# Patient Record
Sex: MALE | NOT HISPANIC OR LATINO | Employment: FULL TIME | URBAN - METROPOLITAN AREA
[De-identification: names, ages, dates, MRNs, and addresses within clinical notes are randomized per-mention and may not be internally consistent; named-entity substitution may affect disease eponyms.]

---

## 2017-02-14 ENCOUNTER — HOSPITAL ENCOUNTER (EMERGENCY)
Facility: HOSPITAL | Age: 32
Discharge: HOME | End: 2017-02-14
Admitting: EMERGENCY MEDICINE

## 2017-02-14 ENCOUNTER — HOSPITAL ENCOUNTER (EMERGENCY)
Facility: HOSPITAL | Age: 32
Discharge: LEFT BEFORE DISCHARGE | End: 2017-02-14

## 2017-02-14 ENCOUNTER — APPOINTMENT (OUTPATIENT)
Dept: GENERAL RADIOLOGY | Facility: HOSPITAL | Age: 32
End: 2017-02-14

## 2017-02-14 VITALS
OXYGEN SATURATION: 98 % | WEIGHT: 240 LBS | RESPIRATION RATE: 18 BRPM | TEMPERATURE: 98.5 F | HEIGHT: 71 IN | HEART RATE: 75 BPM | BODY MASS INDEX: 33.6 KG/M2 | DIASTOLIC BLOOD PRESSURE: 100 MMHG | SYSTOLIC BLOOD PRESSURE: 147 MMHG

## 2017-02-14 DIAGNOSIS — S62.306A CLOSED DISPLACED FRACTURE OF FIFTH METACARPAL BONE OF RIGHT HAND, UNSPECIFIED PORTION OF METACARPAL, INITIAL ENCOUNTER: Primary | ICD-10-CM

## 2017-02-14 PROCEDURE — 99283 EMERGENCY DEPT VISIT LOW MDM: CPT

## 2017-02-14 PROCEDURE — 73130 X-RAY EXAM OF HAND: CPT

## 2017-02-14 PROCEDURE — 99284 EMERGENCY DEPT VISIT MOD MDM: CPT | Performed by: NURSE PRACTITIONER

## 2017-02-14 RX ORDER — HYDROCODONE BITARTRATE AND ACETAMINOPHEN 5; 325 MG/1; MG/1
1 TABLET ORAL EVERY 4 HOURS PRN
Qty: 12 TABLET | Refills: 0 | Status: SHIPPED | OUTPATIENT
Start: 2017-02-14 | End: 2017-03-21

## 2017-02-14 RX ORDER — HYDROCODONE BITARTRATE AND ACETAMINOPHEN 5; 325 MG/1; MG/1
1 TABLET ORAL ONCE AS NEEDED
Status: DISCONTINUED | OUTPATIENT
Start: 2017-02-14 | End: 2017-02-14

## 2017-02-14 NOTE — ED PROVIDER NOTES
Subjective   Patient is a 31 y.o. male presenting with hand injury.   Hand Injury   Location:  Hand  Hand location:  R hand and dorsum of R hand  Injury: yes    Time since incident:  1 day  Mechanism of injury comment:  Patient slammed his hand into a table last night.  Pain details:     Quality:  Throbbing    Radiates to:  Does not radiate    Severity:  Moderate    Onset quality:  Sudden    Duration:  1 day    Timing:  Constant    Progression:  Unchanged  Handedness:  Right-handed  Foreign body present:  No foreign bodies  Prior injury to area:  No  Relieved by:  Nothing  Worsened by:  Exercise and movement  Associated symptoms: decreased range of motion, numbness and swelling    Associated symptoms: no back pain, no fatigue, no fever, no muscle weakness, no stiffness and no tingling        Review of Systems   Constitutional: Negative for activity change, appetite change, chills, diaphoresis, fatigue and fever.   HENT: Negative for congestion, sore throat and trouble swallowing.    Gastrointestinal: Negative for diarrhea, nausea and vomiting.   Musculoskeletal: Positive for arthralgias and joint swelling. Negative for back pain, myalgias and stiffness.   Skin: Negative for color change and rash.   Neurological: Negative for dizziness and headaches.       Past Medical History   Diagnosis Date   • Anxiety    • Depression        No Known Allergies    Past Surgical History   Procedure Laterality Date   • Tonsillectomy         Family History   Problem Relation Age of Onset   • Alcohol abuse Mother    • Hypertension Father    • Heart disease Father    • Alcohol abuse Father        Social History     Social History   • Marital status: Single     Spouse name: N/A   • Number of children: N/A   • Years of education: N/A     Social History Main Topics   • Smoking status: Former Smoker     Types: Cigarettes     Quit date: 2012   • Smokeless tobacco: Never Used   • Alcohol use Yes      Comment: Occasional   • Drug use: No   •  Sexual activity: Not Asked     Other Topics Concern   • None     Social History Narrative           Objective   Physical Exam   Constitutional: He is oriented to person, place, and time. He appears well-developed and well-nourished. No distress.   HENT:   Head: Normocephalic and atraumatic.   Right Ear: External ear normal.   Left Ear: External ear normal.   Nose: Nose normal.   Mouth/Throat: Oropharynx is clear and moist.   Eyes: Conjunctivae are normal. Pupils are equal, round, and reactive to light.   Neck: Neck supple. No tracheal deviation present. No thyromegaly present.   Cardiovascular: Normal rate, normal heart sounds and intact distal pulses.  Exam reveals no gallop.    No murmur heard.  Pulmonary/Chest: Effort normal and breath sounds normal. No respiratory distress. He has no wheezes. He has no rales.   Abdominal: Soft. Bowel sounds are normal. He exhibits no distension. There is no tenderness.   Musculoskeletal:   Dorsum the right hands edematous and erythematous.  Tender to palpation over the third fourth fifth distal metacarpals.  Has limited range of motion of his fingers 3, 4 and 5   Lymphadenopathy:     He has no cervical adenopathy.   Neurological: He is alert and oriented to person, place, and time.   Skin: Skin is warm and dry. He is not diaphoretic. There is erythema. No pallor.   Psychiatric: He has a normal mood and affect. Thought content normal.   Nursing note and vitals reviewed.      Procedures         ED Course  ED Course   Comment By Time   Since the ED with complaint of right hand painful and swollen.  States he slammed his hand into a table last night. MARQUES Sarkar 02/14 1711   X-ray the right hand reveals a closed, medially displaced fracture of the distal head of the fifth metacarpal.  Films were reviewed by Dr. Janki Freire, APRALLISON 02/14 1719   Placed a call to Dr. Cameron Mathew office to give him a referral for this hand fracture. Wan Freire APRALLISON 02/14 0073    Discussed patient with Dr. Peralta.  He gave directions to have the patient call him in the morning to set up an appointment to be seen in possible follow-up with surgery MARQUES Sarkar 02/14 1824   Patient is stable safe to go.  Will D/C  him with a prescription for pain medication. MARQUES Sarkar 02/14 1836                  [facility]    Final diagnoses:   Closed displaced fracture of fifth metacarpal bone of right hand, unspecified portion of metacarpal, initial encounter            MARQUES Sarkar  02/14/17 6308

## 2017-02-14 NOTE — ED NOTES
Pt's right hand is swollen on dorsal side.  He is able to move his fingers.  Right radial pulse is palpable.  Denies numbness or tingling in fingers.     Haleigh Hayes RN  02/14/17 5023

## 2017-02-15 NOTE — H&P
"      Patient Care Team:  Destiny Stock MD as PCP - General (Internal Medicine)    Chief complaint     Subjective \" I broke my right hand\".    History of Present IllnessSustained fracture of small finger metacarpal striking table. Refered by ER.    Review of Systems   Constitutional: Negative.    HENT: Negative.    Eyes: Negative.    Respiratory: Negative.    Cardiovascular: Negative.    Gastrointestinal: Negative.    Endocrine: Negative.    Genitourinary: Negative.    Musculoskeletal: Negative.    Skin: Negative.    Allergic/Immunologic: Negative.    Neurological: Negative.    Hematological: Negative.    Psychiatric/Behavioral: Negative.         Past Medical History   Diagnosis Date   • Anxiety    • Depression      Past Surgical History   Procedure Laterality Date   • Tonsillectomy       Family History   Problem Relation Age of Onset   • Alcohol abuse Mother    • Hypertension Father    • Heart disease Father    • Alcohol abuse Father      Social History   Substance Use Topics   • Smoking status: Former Smoker     Types: Cigarettes     Quit date: 2012   • Smokeless tobacco: Never Used   • Alcohol use Yes      Comment: Occasional     No prescriptions prior to admission.     Allergies:  Review of patient's allergies indicates no known allergies.      Objective      Vital Signs       Physical Exam   Constitutional: He is oriented to person, place, and time. He appears well-developed and well-nourished.   HENT:   Head: Normocephalic and atraumatic.   Right Ear: External ear normal.   Left Ear: External ear normal.   Nose: Nose normal.   Mouth/Throat: Oropharynx is clear and moist.   Eyes: Conjunctivae and EOM are normal. Pupils are equal, round, and reactive to light.   Neck: Normal range of motion. Neck supple.   Cardiovascular: Normal rate, regular rhythm, normal heart sounds and intact distal pulses.    Pulmonary/Chest: Effort normal and breath sounds normal.   Abdominal: Soft. Bowel sounds are normal. "   Musculoskeletal: He exhibits tenderness and deformity.   Neurological: He is alert and oriented to person, place, and time. He has normal reflexes.   Skin: Skin is warm and dry.   Psychiatric: He has a normal mood and affect. His behavior is normal. Judgment and thought content normal.   Swelling of the right hand. Depressed metacarpal head.    Results Review:   I reviewed the patient's new clinical results.      Assessment/Plan  As above.    Active Problems:    * No active hospital problems. *      Assessment & Plan    I discussed the patients findings and my recommendations with patient    Cameron Mathew MD  02/15/17  6:45 PM    Time:

## 2017-02-17 ENCOUNTER — APPOINTMENT (OUTPATIENT)
Dept: GENERAL RADIOLOGY | Facility: HOSPITAL | Age: 32
End: 2017-02-17

## 2017-02-17 ENCOUNTER — ANESTHESIA EVENT (OUTPATIENT)
Dept: PERIOP | Facility: HOSPITAL | Age: 32
End: 2017-02-17

## 2017-02-17 ENCOUNTER — ANESTHESIA (OUTPATIENT)
Dept: PERIOP | Facility: HOSPITAL | Age: 32
End: 2017-02-17

## 2017-02-17 ENCOUNTER — HOSPITAL ENCOUNTER (OUTPATIENT)
Facility: HOSPITAL | Age: 32
Setting detail: HOSPITAL OUTPATIENT SURGERY
Discharge: HOME | End: 2017-02-17
Attending: PLASTIC SURGERY | Admitting: PLASTIC SURGERY

## 2017-02-17 VITALS
SYSTOLIC BLOOD PRESSURE: 133 MMHG | BODY MASS INDEX: 34.86 KG/M2 | WEIGHT: 249 LBS | HEIGHT: 71 IN | HEART RATE: 59 BPM | OXYGEN SATURATION: 99 % | TEMPERATURE: 97.3 F | RESPIRATION RATE: 16 BRPM | DIASTOLIC BLOOD PRESSURE: 82 MMHG

## 2017-02-17 PROCEDURE — 25010000002 FENTANYL CITRATE (PF) 100 MCG/2ML SOLUTION: Performed by: ANESTHESIOLOGY

## 2017-02-17 PROCEDURE — 76000 FLUOROSCOPY <1 HR PHYS/QHP: CPT

## 2017-02-17 PROCEDURE — 25010000002 PROPOFOL 10 MG/ML EMULSION: Performed by: NURSE ANESTHETIST, CERTIFIED REGISTERED

## 2017-02-17 PROCEDURE — C1713 ANCHOR/SCREW BN/BN,TIS/BN: HCPCS | Performed by: PLASTIC SURGERY

## 2017-02-17 PROCEDURE — 73130 X-RAY EXAM OF HAND: CPT

## 2017-02-17 PROCEDURE — 25010000002 ONDANSETRON PER 1 MG: Performed by: NURSE ANESTHETIST, CERTIFIED REGISTERED

## 2017-02-17 PROCEDURE — 25010000002 MIDAZOLAM PER 1 MG: Performed by: ANESTHESIOLOGY

## 2017-02-17 DEVICE — KWIRE SMOTH DBL/TROC .045X4IN: Type: IMPLANTABLE DEVICE | Site: FINGER LITTLE | Status: FUNCTIONAL

## 2017-02-17 RX ORDER — ONDANSETRON 2 MG/ML
4 INJECTION INTRAMUSCULAR; INTRAVENOUS ONCE AS NEEDED
Status: DISCONTINUED | OUTPATIENT
Start: 2017-02-17 | End: 2017-02-17 | Stop reason: HOSPADM

## 2017-02-17 RX ORDER — FAMOTIDINE 10 MG/ML
20 INJECTION, SOLUTION INTRAVENOUS ONCE
Status: COMPLETED | OUTPATIENT
Start: 2017-02-17 | End: 2017-02-17

## 2017-02-17 RX ORDER — LABETALOL HYDROCHLORIDE 5 MG/ML
5 INJECTION, SOLUTION INTRAVENOUS
Status: DISCONTINUED | OUTPATIENT
Start: 2017-02-17 | End: 2017-02-17 | Stop reason: HOSPADM

## 2017-02-17 RX ORDER — HYDROCODONE BITARTRATE AND ACETAMINOPHEN 7.5; 325 MG/1; MG/1
1 TABLET ORAL ONCE AS NEEDED
Status: DISCONTINUED | OUTPATIENT
Start: 2017-02-17 | End: 2017-02-17 | Stop reason: HOSPADM

## 2017-02-17 RX ORDER — HYDROCODONE BITARTRATE AND ACETAMINOPHEN 5; 325 MG/1; MG/1
1 TABLET ORAL ONCE AS NEEDED
Status: DISCONTINUED | OUTPATIENT
Start: 2017-02-17 | End: 2017-02-17 | Stop reason: HOSPADM

## 2017-02-17 RX ORDER — SODIUM CHLORIDE 0.9 % (FLUSH) 0.9 %
1-10 SYRINGE (ML) INJECTION AS NEEDED
Status: DISCONTINUED | OUTPATIENT
Start: 2017-02-17 | End: 2017-02-17 | Stop reason: HOSPADM

## 2017-02-17 RX ORDER — HYDROCODONE BITARTRATE AND ACETAMINOPHEN 5; 325 MG/1; MG/1
1-2 TABLET ORAL EVERY 4 HOURS PRN
Qty: 30 TABLET | Refills: 0 | Status: SHIPPED | OUTPATIENT
Start: 2017-02-17 | End: 2017-03-21

## 2017-02-17 RX ORDER — HYDRALAZINE HYDROCHLORIDE 20 MG/ML
5 INJECTION INTRAMUSCULAR; INTRAVENOUS
Status: DISCONTINUED | OUTPATIENT
Start: 2017-02-17 | End: 2017-02-17 | Stop reason: HOSPADM

## 2017-02-17 RX ORDER — PROMETHAZINE HYDROCHLORIDE 25 MG/1
25 TABLET ORAL ONCE AS NEEDED
Status: DISCONTINUED | OUTPATIENT
Start: 2017-02-17 | End: 2017-02-17 | Stop reason: HOSPADM

## 2017-02-17 RX ORDER — PROPOFOL 10 MG/ML
VIAL (ML) INTRAVENOUS AS NEEDED
Status: DISCONTINUED | OUTPATIENT
Start: 2017-02-17 | End: 2017-02-17 | Stop reason: SURG

## 2017-02-17 RX ORDER — FENTANYL CITRATE 50 UG/ML
50 INJECTION, SOLUTION INTRAMUSCULAR; INTRAVENOUS
Status: DISCONTINUED | OUTPATIENT
Start: 2017-02-17 | End: 2017-02-17 | Stop reason: HOSPADM

## 2017-02-17 RX ORDER — LIDOCAINE HYDROCHLORIDE 20 MG/ML
INJECTION, SOLUTION INFILTRATION; PERINEURAL AS NEEDED
Status: DISCONTINUED | OUTPATIENT
Start: 2017-02-17 | End: 2017-02-17 | Stop reason: SURG

## 2017-02-17 RX ORDER — PROMETHAZINE HYDROCHLORIDE 25 MG/ML
12.5 INJECTION, SOLUTION INTRAMUSCULAR; INTRAVENOUS ONCE AS NEEDED
Status: DISCONTINUED | OUTPATIENT
Start: 2017-02-17 | End: 2017-02-17 | Stop reason: HOSPADM

## 2017-02-17 RX ORDER — HYDROMORPHONE HYDROCHLORIDE 1 MG/ML
0.25 INJECTION, SOLUTION INTRAMUSCULAR; INTRAVENOUS; SUBCUTANEOUS
Status: DISCONTINUED | OUTPATIENT
Start: 2017-02-17 | End: 2017-02-17 | Stop reason: HOSPADM

## 2017-02-17 RX ORDER — NALOXONE HCL 0.4 MG/ML
0.2 VIAL (ML) INJECTION AS NEEDED
Status: DISCONTINUED | OUTPATIENT
Start: 2017-02-17 | End: 2017-02-17 | Stop reason: HOSPADM

## 2017-02-17 RX ORDER — MIDAZOLAM HYDROCHLORIDE 1 MG/ML
1 INJECTION INTRAMUSCULAR; INTRAVENOUS
Status: DISCONTINUED | OUTPATIENT
Start: 2017-02-17 | End: 2017-02-17 | Stop reason: HOSPADM

## 2017-02-17 RX ORDER — ONDANSETRON 2 MG/ML
INJECTION INTRAMUSCULAR; INTRAVENOUS AS NEEDED
Status: DISCONTINUED | OUTPATIENT
Start: 2017-02-17 | End: 2017-02-17 | Stop reason: SURG

## 2017-02-17 RX ORDER — OXYCODONE AND ACETAMINOPHEN 7.5; 325 MG/1; MG/1
1 TABLET ORAL ONCE AS NEEDED
Status: COMPLETED | OUTPATIENT
Start: 2017-02-17 | End: 2017-02-17

## 2017-02-17 RX ORDER — DIPHENHYDRAMINE HYDROCHLORIDE 50 MG/ML
12.5 INJECTION INTRAMUSCULAR; INTRAVENOUS
Status: DISCONTINUED | OUTPATIENT
Start: 2017-02-17 | End: 2017-02-17 | Stop reason: HOSPADM

## 2017-02-17 RX ORDER — PROMETHAZINE HYDROCHLORIDE 25 MG/1
12.5 TABLET ORAL ONCE AS NEEDED
Status: DISCONTINUED | OUTPATIENT
Start: 2017-02-17 | End: 2017-02-17 | Stop reason: HOSPADM

## 2017-02-17 RX ORDER — SODIUM CHLORIDE, SODIUM LACTATE, POTASSIUM CHLORIDE, CALCIUM CHLORIDE 600; 310; 30; 20 MG/100ML; MG/100ML; MG/100ML; MG/100ML
9 INJECTION, SOLUTION INTRAVENOUS CONTINUOUS
Status: DISCONTINUED | OUTPATIENT
Start: 2017-02-17 | End: 2017-02-17 | Stop reason: HOSPADM

## 2017-02-17 RX ORDER — PROMETHAZINE HYDROCHLORIDE 25 MG/1
25 SUPPOSITORY RECTAL ONCE AS NEEDED
Status: DISCONTINUED | OUTPATIENT
Start: 2017-02-17 | End: 2017-02-17 | Stop reason: HOSPADM

## 2017-02-17 RX ORDER — BUPIVACAINE HYDROCHLORIDE 5 MG/ML
INJECTION, SOLUTION PERINEURAL AS NEEDED
Status: DISCONTINUED | OUTPATIENT
Start: 2017-02-17 | End: 2017-02-17 | Stop reason: HOSPADM

## 2017-02-17 RX ORDER — MIDAZOLAM HYDROCHLORIDE 1 MG/ML
2 INJECTION INTRAMUSCULAR; INTRAVENOUS
Status: DISCONTINUED | OUTPATIENT
Start: 2017-02-17 | End: 2017-02-17 | Stop reason: HOSPADM

## 2017-02-17 RX ADMIN — PROPOFOL 300 MG: 10 INJECTION, EMULSION INTRAVENOUS at 14:07

## 2017-02-17 RX ADMIN — LIDOCAINE HYDROCHLORIDE 40 MG: 20 INJECTION, SOLUTION INFILTRATION; PERINEURAL at 14:07

## 2017-02-17 RX ADMIN — FENTANYL CITRATE 25 MCG: 50 INJECTION INTRAMUSCULAR; INTRAVENOUS at 14:12

## 2017-02-17 RX ADMIN — SODIUM CHLORIDE, POTASSIUM CHLORIDE, SODIUM LACTATE AND CALCIUM CHLORIDE 9 ML/HR: 600; 310; 30; 20 INJECTION, SOLUTION INTRAVENOUS at 12:26

## 2017-02-17 RX ADMIN — HYDROCODONE BITARTRATE AND ACETAMINOPHEN 1 TABLET: 5; 325 TABLET ORAL at 15:35

## 2017-02-17 RX ADMIN — ONDANSETRON 4 MG: 2 INJECTION INTRAMUSCULAR; INTRAVENOUS at 14:31

## 2017-02-17 RX ADMIN — FAMOTIDINE 20 MG: 10 INJECTION, SOLUTION INTRAVENOUS at 13:21

## 2017-02-17 RX ADMIN — FENTANYL CITRATE 75 MCG: 50 INJECTION INTRAMUSCULAR; INTRAVENOUS at 14:31

## 2017-02-17 RX ADMIN — MIDAZOLAM 2 MG: 1 INJECTION INTRAMUSCULAR; INTRAVENOUS at 13:21

## 2017-02-17 RX ADMIN — OXYCODONE HYDROCHLORIDE AND ACETAMINOPHEN 1 TABLET: 7.5; 325 TABLET ORAL at 16:29

## 2017-02-17 RX ADMIN — FENTANYL CITRATE 50 MCG: 50 INJECTION INTRAMUSCULAR; INTRAVENOUS at 15:23

## 2017-02-17 NOTE — PLAN OF CARE
Problem: Patient Care Overview (Adult)  Goal: Plan of Care Review  Outcome: Ongoing (interventions implemented as appropriate)    02/17/17 1201   Coping/Psychosocial Response Interventions   Plan Of Care Reviewed With patient   Patient Care Overview   Progress improving

## 2017-02-17 NOTE — PLAN OF CARE
Problem: Perioperative Period (Adult)  Goal: Signs and Symptoms of Listed Potential Problems Will be Absent or Manageable (Perioperative Period)  Outcome: Ongoing (interventions implemented as appropriate)    02/17/17 1201   Perioperative Period   Problems Assessed (Perioperative Period) all   Problems Present (Perioperative Period) none

## 2017-02-17 NOTE — BRIEF OP NOTE
FINGER/THUMB OPEN REDUCTION INTERNAL FIXATION  Procedure Note    Luis Miguel Carrera  2/17/2017    Pre-op Diagnosis:   Fracture of small finger metacarpal neck with displacement    Post-op Diagnosis:     same    Procedure/CPT® Codes:      Procedure(s):  PERCUTANOUS FIXATION OF SMALL FINGER METACARPAL FRACTURE RT HAND    Surgeon(s):  Cameron Mathew MD    Anesthesia: General    Staff:   Circulator: Ana Corbett RN  Radiology Technologist: Debra Hopkins RRT  Scrub Person: Fei Espinal    Estimated Blood Loss: none    Specimens:                * No specimens in log *      Drains:   0       Findings: As above  mplications: none      Cameron Mathew MD     Date: 2/17/2017  Time: 2:44 PM

## 2017-02-17 NOTE — PLAN OF CARE
Problem: Patient Care Overview (Adult)  Goal: Plan of Care Review  Outcome: Ongoing (interventions implemented as appropriate)    02/17/17 1200   Coping/Psychosocial Response Interventions   Plan Of Care Reviewed With patient   Patient Care Overview   Progress improving       Goal: Discharge Needs Assessment  Outcome: Outcome(s) achieved Date Met:  02/17/17 02/17/17 1200   Discharge Needs Assessment   Concerns To Be Addressed no discharge needs identified   Equipment Needed After Discharge none   Self-Care   Equipment Currently Used at Home none

## 2017-02-17 NOTE — PLAN OF CARE
Problem: Patient Care Overview (Adult)  Goal: Discharge Needs Assessment  Outcome: Outcome(s) achieved Date Met:  02/17/17 02/17/17 7614   Discharge Needs Assessment   Concerns To Be Addressed no discharge needs identified

## 2017-02-17 NOTE — PLAN OF CARE
Problem: Patient Care Overview (Adult)  Goal: Adult Individualization and Mutuality  Outcome: Outcome(s) achieved Date Met:  02/17/17

## 2017-02-17 NOTE — ANESTHESIA PROCEDURE NOTES
Airway  Urgency: elective    Airway not difficult    General Information and Staff    Patient location during procedure: OR  Anesthesiologist: RENDER, RIP RAY  CRNA: JOANNE GILLESPIE    Indications and Patient Condition  Indications for airway management: airway protection    Preoxygenated: yes  Mask difficulty assessment: 1 - vent by mask    Final Airway Details  Final airway type: supraglottic airway      Successful airway: unique  Size 5    Number of attempts at approach: 1    Additional Comments  Smooth IV/mask induction and placement of LMA. Atraumatic, lips/teeth/mouth intact, as preop. +ETCO2, bilateral breath sounds and equal.

## 2017-02-17 NOTE — PLAN OF CARE
Problem: Patient Care Overview (Adult)  Goal: Plan of Care Review  Outcome: Outcome(s) achieved Date Met:  02/17/17 02/17/17 0099   Coping/Psychosocial Response Interventions   Plan Of Care Reviewed With patient   Patient Care Overview   Progress improving

## 2017-02-17 NOTE — PLAN OF CARE
Problem: Perioperative Period (Adult)  Goal: Signs and Symptoms of Listed Potential Problems Will be Absent or Manageable (Perioperative Period)  Outcome: Outcome(s) achieved Date Met:  02/17/17 02/17/17 8194   Perioperative Period   Problems Assessed (Perioperative Period) all   Problems Present (Perioperative Period) none

## 2017-02-17 NOTE — ANESTHESIA POSTPROCEDURE EVALUATION
Patient: Luis Miguel Carrera    Procedure Summary     Date Anesthesia Start Anesthesia Stop Room / Location    02/17/17 1400  [facility] / [facility]       Procedure Diagnosis Surgeon Provider    PERCUTANOUS FIXATION OF SMALL FINGER METACARPAL FRACTURE RT HAND (Right Hand) No diagnosis on file. MD Donny Cooney MD          Anesthesia Type: general  Last vitals  BP      Temp      Pulse 68 (02/17/17 1326)   Resp 18 (02/17/17 1326)    SpO2 98 % (02/17/17 1326)      Post Anesthesia Care and Evaluation    Patient location during evaluation: bedside  Patient participation: complete - patient participated  Level of consciousness: awake and alert  Pain management: adequate  Airway patency: patent  Anesthetic complications: No anesthetic complications    Cardiovascular status: acceptable  Respiratory status: acceptable  Hydration status: acceptable

## 2017-02-17 NOTE — PLAN OF CARE
Problem: Perioperative Period (Adult)  Goal: Signs and Symptoms of Listed Potential Problems Will be Absent or Manageable (Perioperative Period)  Outcome: Ongoing (interventions implemented as appropriate)    02/17/17 1528   Perioperative Period   Problems Assessed (Perioperative Period) all   Problems Present (Perioperative Period) pain

## 2017-02-17 NOTE — ANESTHESIA PREPROCEDURE EVALUATION
Anesthesia Evaluation     Patient summary reviewed and Nursing notes reviewed      Airway   Mallampati: I  TM distance: >3 FB  Neck ROM: full  Dental - normal exam     Pulmonary - normal exam   (+) a smoker Former,   Cardiovascular - negative cardio ROS and normal exam        Neuro/Psych  (+) psychiatric history Anxiety and Depression,    GI/Hepatic/Renal/Endo - negative ROS     Musculoskeletal (-) negative ROS    Abdominal  - normal exam   Substance History - negative use     OB/GYN          Other - negative ROS                                   Anesthesia Plan    ASA 2     general     intravenous induction   Anesthetic plan and risks discussed with patient.

## 2017-02-20 NOTE — OP NOTE
Date of Procedure:  02/17/2017    PREOPERATIVE DIAGNOSIS: Fracture involving the neck of the small finger metacarpal of the right hand with displacement.    POSTOPERATIVE DIAGNOSIS: Fracture involving the neck of the small finger metacarpal of the right hand with displacement.    SURGEON: Cameron Mathew MD    PROCEDURE PERFORMED: Reduction and percutaneous fixation of a fracture involving the neck of the small finger metacarpal of the right hand.    ANESTHESIA: General.    INDICATION FOR PROCEDURE: This 31-year-old male sustained an injury of his right hand when he became aggravated and struck a table with his fist. The patient was initially evaluated by the emergency room physician. Radiographs taken of the right hand demonstrated the presence of a fracture of the neck of the small finger metacarpal with displacement. A hand surgery consultation was requested for the evaluation and treatment of the injury. The potential risks and expected benefits of the operative procedure were discussed with the patient in the office.    DESCRIPTION OF PROCEDURE: The patient was brought to the operating room and placed in the supine position. He was then administered a general anesthetic. The right hand and forearm were prepped with surgical soap and sterilely draped.    The C-arm fluoroscope was then brought into position.    A preoperative fluoroscopic image was obtained.    The fracture was then reduced by exerting pressure in a distal to proximal direction. The fluoroscopic image confirmed the adequate reduction of the fracture. This reduction was maintained by the insertion of 0.045 K wires x2. Fluoroscopic image confirmed the adequate reduction and fixation of the fracture.    The ends of the K wires were clipped and looped. Bacitracin ointment, Adaptic, and a soft compression dressing were applied. A fiberglass ulnar gutter splint was finally applied.    The patient tolerated this operation well and left the operating  room in stable condition.        Cameron Mathew M.D.  TDC:belkis  D:   02/19/2017 13:58:38  T:   02/19/2017 19:08:16  Job ID:   [id number]  Document ID:   [id number]  cc:

## 2017-03-21 ENCOUNTER — OFFICE VISIT (OUTPATIENT)
Dept: INTERNAL MEDICINE | Facility: CLINIC | Age: 32
End: 2017-03-21

## 2017-03-21 VITALS
BODY MASS INDEX: 35.56 KG/M2 | SYSTOLIC BLOOD PRESSURE: 130 MMHG | HEIGHT: 71 IN | HEART RATE: 92 BPM | DIASTOLIC BLOOD PRESSURE: 80 MMHG | OXYGEN SATURATION: 98 % | WEIGHT: 254 LBS

## 2017-03-21 DIAGNOSIS — F42.9 OCD (OBSESSIVE COMPULSIVE DISORDER): ICD-10-CM

## 2017-03-21 DIAGNOSIS — J30.9 ALLERGIC RHINITIS, UNSPECIFIED ALLERGIC RHINITIS TRIGGER, UNSPECIFIED RHINITIS SEASONALITY: ICD-10-CM

## 2017-03-21 DIAGNOSIS — H60.92 OTITIS EXTERNA OF LEFT EAR, UNSPECIFIED CHRONICITY, UNSPECIFIED TYPE: ICD-10-CM

## 2017-03-21 DIAGNOSIS — F41.9 ANXIETY: Primary | ICD-10-CM

## 2017-03-21 DIAGNOSIS — H66.005 RECURRENT ACUTE SUPPURATIVE OTITIS MEDIA WITHOUT SPONTANEOUS RUPTURE OF LEFT TYMPANIC MEMBRANE: ICD-10-CM

## 2017-03-21 DIAGNOSIS — F31.60 BIPOLAR AFFECTIVE DISORDER, CURRENT EPISODE MIXED, CURRENT EPISODE SEVERITY UNSPECIFIED (HCC): ICD-10-CM

## 2017-03-21 DIAGNOSIS — R45.4 EXCESSIVE ANGER: ICD-10-CM

## 2017-03-21 PROBLEM — K21.9 GASTROESOPHAGEAL REFLUX DISEASE WITHOUT ESOPHAGITIS: Status: ACTIVE | Noted: 2017-03-21

## 2017-03-21 PROCEDURE — 99214 OFFICE O/P EST MOD 30 MIN: CPT | Performed by: INTERNAL MEDICINE

## 2017-03-21 RX ORDER — OFLOXACIN 3 MG/ML
5 SOLUTION AURICULAR (OTIC) DAILY
Qty: 5 ML | Refills: 0 | Status: SHIPPED | OUTPATIENT
Start: 2017-03-21 | End: 2017-03-28

## 2017-03-21 RX ORDER — MOXIFLOXACIN HYDROCHLORIDE 400 MG/1
400 TABLET ORAL DAILY
Qty: 7 TABLET | Refills: 0 | Status: SHIPPED | OUTPATIENT
Start: 2017-03-21 | End: 2017-03-28

## 2017-03-21 NOTE — PATIENT INSTRUCTIONS
I want you to take Zyrtec daily for allergies   Take antibiotics as I prescribed for your ear and we will schedule ENT appointment for you   Wean Paxil as we discussed in clinic   Call elida to make appointment

## 2017-03-21 NOTE — PROGRESS NOTES
Subjective     Luis Miguel Carrera is a 31 y.o. male, who presents with a chief complaint of   Chief Complaint   Patient presents with   • Follow-up     anxiety/ therapist    • Anxiety     f/u    • Earache     L ear, pt states maybe an ENT is needed       HPI Comments:   Patient is here for follow up of his anxiety and mental health issues. He broke his right hand about one month ago due to anger at home. He states that his mother in law is the caretaker for his son Luis Miguel and could not pick him up at school due to her gas tank being low. He says that his mother in law is an addict and has issues with narcotics and he got angry and punched a table.     I spoke to his therapist yesterday, Raleigh Cardenas, who said that he was worried about the patient and feels that he has Bipolar in addition to his OCD, anxiety and anger issues. His wife went to the therapy appointment with him and that helped him. He states that he is having lows and highs in his mood regularly and when I discussed with him what Bipolar is, he agrees.     Recurrent left ear infection: I treated him with Augmentin several months ago and this improved, however, he started having pain, hearing loss and drainage again a few days ago. No fever or redness. He has also had runny, itchy nose.        The following portions of the patient's history were reviewed and updated as appropriate: allergies, current medications, past family history, past medical history, past social history, past surgical history and problem list.    Allergies: Review of patient's allergies indicates no known allergies.    Current Outpatient Prescriptions:   •  melatonin 1 MG tablet, Take 1 mg by mouth Every Night., Disp: , Rfl:   •  pantoprazole (PROTONIX) 20 MG EC tablet, Take 1 tablet by mouth Daily. (Patient taking differently: Take 20 mg by mouth Every Morning.), Disp: 30 tablet, Rfl: 3  •  moxifloxacin (AVELOX) 400 MG tablet, Take 1 tablet by mouth Daily for 7 days., Disp: 7 tablet, Rfl:  "0  •  ofloxacin (FLOXIN) 0.3 % otic solution, Administer 5 drops into the left ear Daily for 7 days., Disp: 5 mL, Rfl: 0  Medications Discontinued During This Encounter   Medication Reason   • HYDROcodone-acetaminophen (NORCO) 5-325 MG per tablet    • HYDROcodone-acetaminophen (NORCO) 5-325 MG per tablet    • PARoxetine (PAXIL) 20 MG tablet Therapy completed       Review of Systems   Constitutional: Negative for chills and fever.   HENT: Positive for ear discharge and ear pain.    Respiratory: Negative for cough and shortness of breath.    Skin: Negative for rash.   Psychiatric/Behavioral: Positive for agitation, behavioral problems, decreased concentration, dysphoric mood and sleep disturbance. Negative for suicidal ideas.       Objective     Visit Vitals   • /80 (BP Location: Left arm, Patient Position: Sitting, Cuff Size: Adult)   • Pulse 92   • Ht 71\" (180.3 cm)   • Wt 254 lb (115 kg)   • SpO2 98%   • BMI 35.43 kg/m2         Physical Exam   Constitutional: He is oriented to person, place, and time. He appears well-developed and well-nourished. No distress.   HENT:   Head: Normocephalic and atraumatic.   Right Ear: Tympanic membrane and external ear normal.   Left Ear: External ear normal. There is drainage and swelling (Canal is swollen with white debris and redness). Tympanic membrane is injected, erythematous and bulging. Decreased hearing is noted.   Mouth/Throat: Oropharynx is clear and moist. No oropharyngeal exudate.   Eyes: Conjunctivae are normal. Right eye exhibits no discharge. Left eye exhibits no discharge. No scleral icterus.   Neck: Neck supple.   Cardiovascular: Normal rate, regular rhythm and normal heart sounds.  Exam reveals no gallop and no friction rub.    No murmur heard.  Pulmonary/Chest: Effort normal and breath sounds normal. No respiratory distress. He has no wheezes. He has no rales.   Lymphadenopathy:     He has no cervical adenopathy.   Neurological: He is alert and oriented to " person, place, and time.   Skin: Skin is warm. No rash noted.   Psychiatric: He has a normal mood and affect. His behavior is normal.   Nursing note and vitals reviewed.        Results for orders placed or performed in visit on 12/14/16   Comprehensive Metabolic Panel   Result Value Ref Range    Glucose 97 65 - 99 mg/dL    BUN 15 6 - 20 mg/dL    Creatinine 1.04 0.76 - 1.27 mg/dL    eGFR Non African Am 84 >60 mL/min/1.73    eGFR African Am 102 >60 mL/min/1.73    BUN/Creatinine Ratio 14.4 7.0 - 25.0    Sodium 143 136 - 145 mmol/L    Potassium 4.1 3.5 - 5.2 mmol/L    Chloride 102 98 - 107 mmol/L    Total CO2 28.1 22.0 - 29.0 mmol/L    Calcium 10.1 8.6 - 10.5 mg/dL    Total Protein 7.1 6.0 - 8.5 g/dL    Albumin 4.60 3.50 - 5.20 g/dL    Globulin 2.5 gm/dL    A/G Ratio 1.8 g/dL    Total Bilirubin 0.3 0.2 - 1.2 mg/dL    Alkaline Phosphatase 95 40 - 129 U/L    AST (SGOT) 17 5 - 40 U/L    ALT (SGPT) 37 5 - 41 U/L   Lipase   Result Value Ref Range    Lipase 24 13 - 60 U/L   Amylase   Result Value Ref Range    Amylase 61 28 - 100 U/L   CBC & Differential   Result Value Ref Range    WBC 5.95 4.80 - 10.80 10*3/mm3    RBC 5.67 4.70 - 6.10 10*6/mm3    Hemoglobin 16.6 14.0 - 18.0 g/dL    Hematocrit 47.5 42.0 - 52.0 %    MCV 83.8 80.0 - 94.0 fL    MCH 29.3 27.0 - 31.0 pg    MCHC 34.9 31.0 - 37.0 g/dL    RDW 11.7 11.5 - 14.5 %    Platelets 326 140 - 500 10*3/mm3    Neutrophil Rel % 49.9 45.0 - 70.0 %    Lymphocyte Rel % 36.1 20.0 - 45.0 %    Monocyte Rel % 9.2 (H) 3.0 - 8.0 %    Eosinophil Rel % 3.7 0.0 - 4.0 %    Basophil Rel % 0.8 0.0 - 2.0 %    Neutrophils Absolute 2.96 1.50 - 8.30 10*3/mm3    Lymphocytes Absolute 2.15 0.60 - 4.80 10*3/mm3    Monocytes Absolute 0.55 0.00 - 1.00 10*3/mm3    Eosinophils Absolute 0.22 0.10 - 0.30 10*3/mm3    Basophils Absolute 0.05 0.00 - 0.20 10*3/mm3    Immature Granulocyte Rel % 0.3 0.0 - 0.5 %    Immature Grans Absolute 0.02 0.00 - 0.03 10*3/mm3    nRBC 0.0 0.0 - 0.0 /100 WBC        Assessment/Plan   Luis Miguel was seen today for follow-up, anxiety and earache.    Diagnoses and all orders for this visit:    Anxiety    Bipolar affective disorder, current episode mixed, current episode severity unspecified    OCD (obsessive compulsive disorder)    Excessive anger    Recurrent acute suppurative otitis media without spontaneous rupture of left tympanic membrane  -     Ambulatory Referral to ENT (Otolaryngology)    Otitis externa of left ear, unspecified chronicity, unspecified type  -     Ambulatory Referral to ENT (Otolaryngology)    Allergic rhinitis, unspecified allergic rhinitis trigger, unspecified rhinitis seasonality    Other orders  -     moxifloxacin (AVELOX) 400 MG tablet; Take 1 tablet by mouth Daily for 7 days.  -     ofloxacin (FLOXIN) 0.3 % otic solution; Administer 5 drops into the left ear Daily for 7 days.      Due to new diagnosis of bipolar, we will wean him off of his Paxil (patient instructed on how to do this ) and have him follow up with Jean for evaluation. Patient was also given information for other psychiatrist in the area that would be helpful. No SI or HI currently, but I do think that he needs assistance soon and he realizes this. They will help him with anger issues as well. Will follow up with me in about 4-6 weeks.     Ear infection: will treat with Avelox and ofloxacin drops since he failed Augmentin and will send him to ENT to be evaluated. He may need CT scan of ear/skull to evaluate, but I would like ENT to order if they think this is neccessary.     Will start Zyrtec for allergies    Return in about 5 years (around 3/21/2022) for Recheck of anxiety and bipolar.    Destiny Stock MD  03/21/2017

## 2017-03-28 ENCOUNTER — APPOINTMENT (OUTPATIENT)
Dept: GENERAL RADIOLOGY | Facility: HOSPITAL | Age: 32
End: 2017-03-28

## 2017-03-28 ENCOUNTER — HOSPITAL ENCOUNTER (EMERGENCY)
Facility: HOSPITAL | Age: 32
Discharge: HOME | End: 2017-03-28
Attending: EMERGENCY MEDICINE | Admitting: EMERGENCY MEDICINE

## 2017-03-28 VITALS
HEIGHT: 71 IN | RESPIRATION RATE: 18 BRPM | TEMPERATURE: 98.6 F | BODY MASS INDEX: 35 KG/M2 | OXYGEN SATURATION: 96 % | SYSTOLIC BLOOD PRESSURE: 141 MMHG | WEIGHT: 250 LBS | HEART RATE: 86 BPM | DIASTOLIC BLOOD PRESSURE: 103 MMHG

## 2017-03-28 DIAGNOSIS — IMO0001 ELEVATED BLOOD PRESSURE: ICD-10-CM

## 2017-03-28 DIAGNOSIS — S83.92XA SPRAIN OF LEFT KNEE, UNSPECIFIED LIGAMENT, INITIAL ENCOUNTER: ICD-10-CM

## 2017-03-28 DIAGNOSIS — S46.911A RIGHT SHOULDER STRAIN, INITIAL ENCOUNTER: ICD-10-CM

## 2017-03-28 DIAGNOSIS — S93.401A SPRAIN OF RIGHT ANKLE, UNSPECIFIED LIGAMENT, INITIAL ENCOUNTER: Primary | ICD-10-CM

## 2017-03-28 PROCEDURE — 99283 EMERGENCY DEPT VISIT LOW MDM: CPT

## 2017-03-28 PROCEDURE — 73562 X-RAY EXAM OF KNEE 3: CPT

## 2017-03-28 PROCEDURE — 73610 X-RAY EXAM OF ANKLE: CPT

## 2017-03-28 PROCEDURE — 99284 EMERGENCY DEPT VISIT MOD MDM: CPT | Performed by: EMERGENCY MEDICINE

## 2017-03-28 PROCEDURE — 73030 X-RAY EXAM OF SHOULDER: CPT

## 2017-03-28 RX ORDER — IBUPROFEN 800 MG/1
TABLET ORAL
Qty: 30 TABLET | Refills: 0 | Status: SHIPPED | OUTPATIENT
Start: 2017-03-28 | End: 2017-07-18 | Stop reason: HOSPADM

## 2017-03-28 RX ORDER — HYDROCODONE BITARTRATE AND ACETAMINOPHEN 5; 325 MG/1; MG/1
TABLET ORAL
Qty: 20 TABLET | Refills: 0 | Status: SHIPPED | OUTPATIENT
Start: 2017-03-28 | End: 2017-07-18 | Stop reason: HOSPADM

## 2017-03-28 NOTE — ED NOTES
Right ankle aircast applied,pulses intact, ace wrap applied to right ankle     Thomas Strong  03/28/17 1494
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
## 2017-03-28 NOTE — ED NOTES
Crutches provided and fitted, training provided,pt. demonstrated proper use     Thomas Strong  03/28/17 1600

## 2017-03-28 NOTE — ED NOTES
Pt returned to ED needing a work note.  Yulia Duarte RN wrote the note for March 28th thru March 30th.  At discharge the MD notified pt that he needed to follow up with ortho in 2 days, those instructions were reiterated to the pt once again.     Jocelin Whiteside  03/28/17 1306

## 2017-04-21 ENCOUNTER — TELEPHONE (OUTPATIENT)
Dept: INTERNAL MEDICINE | Facility: CLINIC | Age: 32
End: 2017-04-21

## 2017-04-21 NOTE — TELEPHONE ENCOUNTER
----- Message from Destiny Stock MD sent at 4/21/2017 12:30 PM EDT -----  Great! Thank you.   ----- Message -----     From: Gema Johnson MA     Sent: 4/21/2017  11:29 AM       To: Destiny Stock MD    I contacted the patient and he states that he called [facility]. I advised for him to contact [facility], knowing they have a large variety of resources and ask if he could keep his current therapist Raleigh Cardenas. I also advised if they denied, ask if the center know of any offices that would allow him to keep both.     ----- Message -----     From: Destiny Stock MD     Sent: 4/20/2017   3:28 PM       To: Gema Johnson MA    Please call patient and see which psychiatrist he tried to call that would not let him keep Raleigh Cardenas as his therapist? Apparently, he tried to call someone and they said he would need to switch and he does not want to do this.     I think that we provided him with several numbers and I would suggest calling the other places, probably [facility]. Raleigh was unsure of who he called. Just wanted to check on him.

## 2017-05-08 ENCOUNTER — OFFICE VISIT (OUTPATIENT)
Dept: INTERNAL MEDICINE | Facility: CLINIC | Age: 32
End: 2017-05-08

## 2017-05-08 ENCOUNTER — HOSPITAL ENCOUNTER (OUTPATIENT)
Dept: GENERAL RADIOLOGY | Facility: HOSPITAL | Age: 32
Discharge: HOME | End: 2017-05-08
Attending: FAMILY MEDICINE | Admitting: FAMILY MEDICINE

## 2017-05-08 VITALS
TEMPERATURE: 97.9 F | HEART RATE: 97 BPM | BODY MASS INDEX: 36.68 KG/M2 | WEIGHT: 262 LBS | DIASTOLIC BLOOD PRESSURE: 80 MMHG | HEIGHT: 71 IN | SYSTOLIC BLOOD PRESSURE: 116 MMHG | OXYGEN SATURATION: 97 %

## 2017-05-08 DIAGNOSIS — R06.02 SHORTNESS OF BREATH: Primary | ICD-10-CM

## 2017-05-08 DIAGNOSIS — R06.02 SHORTNESS OF BREATH: ICD-10-CM

## 2017-05-08 DIAGNOSIS — F31.60 BIPOLAR AFFECTIVE DISORDER, CURRENT EPISODE MIXED, CURRENT EPISODE SEVERITY UNSPECIFIED (HCC): ICD-10-CM

## 2017-05-08 DIAGNOSIS — R10.84 GENERALIZED ABDOMINAL PAIN: ICD-10-CM

## 2017-05-08 PROCEDURE — 71020 HC CHEST PA AND LATERAL: CPT

## 2017-05-08 PROCEDURE — 99214 OFFICE O/P EST MOD 30 MIN: CPT | Performed by: FAMILY MEDICINE

## 2017-05-09 LAB
ALBUMIN SERPL-MCNC: 4.3 G/DL (ref 3.5–5.2)
ALBUMIN/GLOB SERPL: 1.5 G/DL
ALP SERPL-CCNC: 101 U/L (ref 40–129)
ALT SERPL-CCNC: 39 U/L (ref 5–41)
AST SERPL-CCNC: 20 U/L (ref 5–40)
BASOPHILS # BLD AUTO: 0.05 10*3/MM3 (ref 0–0.2)
BASOPHILS NFR BLD AUTO: 0.7 % (ref 0–2)
BILIRUB SERPL-MCNC: 0.3 MG/DL (ref 0.2–1.2)
BNP SERPL-MCNC: 4.8 PG/ML (ref 0–100)
BUN SERPL-MCNC: 20 MG/DL (ref 6–20)
BUN/CREAT SERPL: 24.7 (ref 7–25)
CALCIUM SERPL-MCNC: 9.6 MG/DL (ref 8.6–10.5)
CHLORIDE SERPL-SCNC: 101 MMOL/L (ref 98–107)
CO2 SERPL-SCNC: 26.6 MMOL/L (ref 22–29)
CREAT SERPL-MCNC: 0.81 MG/DL (ref 0.76–1.27)
CRP SERPL-MCNC: 0.72 MG/DL (ref 0–0.5)
EOSINOPHIL # BLD AUTO: 0.19 10*3/MM3 (ref 0.1–0.3)
EOSINOPHIL NFR BLD AUTO: 2.6 % (ref 0–4)
ERYTHROCYTE [DISTWIDTH] IN BLOOD BY AUTOMATED COUNT: 11.9 % (ref 11.5–14.5)
ERYTHROCYTE [SEDIMENTATION RATE] IN BLOOD BY WESTERGREN METHOD: 5 MM/HR (ref 0–15)
FERRITIN SERPL-MCNC: 200 NG/ML (ref 30–400)
GLOBULIN SER CALC-MCNC: 2.8 GM/DL
GLUCOSE SERPL-MCNC: 96 MG/DL (ref 65–99)
HCT VFR BLD AUTO: 47 % (ref 42–52)
HGB BLD-MCNC: 16.3 G/DL (ref 14–18)
IMM GRANULOCYTES # BLD: 0.01 10*3/MM3 (ref 0–0.03)
IMM GRANULOCYTES NFR BLD: 0.1 % (ref 0–0.5)
LYMPHOCYTES # BLD AUTO: 2.04 10*3/MM3 (ref 0.6–4.8)
LYMPHOCYTES NFR BLD AUTO: 27.9 % (ref 20–45)
MCH RBC QN AUTO: 29.3 PG (ref 27–31)
MCHC RBC AUTO-ENTMCNC: 34.7 G/DL (ref 31–37)
MCV RBC AUTO: 84.4 FL (ref 80–94)
MONOCYTES # BLD AUTO: 0.51 10*3/MM3 (ref 0–1)
MONOCYTES NFR BLD AUTO: 7 % (ref 3–8)
NEUTROPHILS # BLD AUTO: 4.51 10*3/MM3 (ref 1.5–8.3)
NEUTROPHILS NFR BLD AUTO: 61.7 % (ref 45–70)
NRBC BLD AUTO-RTO: 0 /100 WBC (ref 0–0)
PLATELET # BLD AUTO: 339 10*3/MM3 (ref 140–500)
POTASSIUM SERPL-SCNC: 4.2 MMOL/L (ref 3.5–5.2)
PROT SERPL-MCNC: 7.1 G/DL (ref 6–8.5)
RBC # BLD AUTO: 5.57 10*6/MM3 (ref 4.7–6.1)
SODIUM SERPL-SCNC: 142 MMOL/L (ref 136–145)
WBC # BLD AUTO: 7.31 10*3/MM3 (ref 4.8–10.8)

## 2017-05-15 ENCOUNTER — TELEPHONE (OUTPATIENT)
Dept: INTERNAL MEDICINE | Facility: CLINIC | Age: 32
End: 2017-05-15

## 2017-06-15 ENCOUNTER — OFFICE VISIT (OUTPATIENT)
Dept: INTERNAL MEDICINE | Facility: CLINIC | Age: 32
End: 2017-06-15

## 2017-06-15 VITALS
BODY MASS INDEX: 37.1 KG/M2 | DIASTOLIC BLOOD PRESSURE: 82 MMHG | RESPIRATION RATE: 18 BRPM | SYSTOLIC BLOOD PRESSURE: 134 MMHG | OXYGEN SATURATION: 98 % | WEIGHT: 266 LBS | HEART RATE: 98 BPM

## 2017-06-15 DIAGNOSIS — S49.92XA SHOULDER INJURY, LEFT, INITIAL ENCOUNTER: ICD-10-CM

## 2017-06-15 DIAGNOSIS — S46.002S ROTATOR CUFF INJURY, LEFT, SEQUELA: ICD-10-CM

## 2017-06-15 DIAGNOSIS — H66.3X3 CHRONIC SUPPURATIVE OTITIS MEDIA OF BOTH EARS, UNSPECIFIED OTITIS MEDIA LOCATION: Primary | ICD-10-CM

## 2017-06-15 DIAGNOSIS — H60.332 ACUTE SWIMMER'S EAR OF LEFT SIDE: ICD-10-CM

## 2017-06-15 PROCEDURE — 99214 OFFICE O/P EST MOD 30 MIN: CPT | Performed by: INTERNAL MEDICINE

## 2017-06-15 RX ORDER — MOXIFLOXACIN HYDROCHLORIDE 400 MG/1
400 TABLET ORAL DAILY
Qty: 7 TABLET | Refills: 0 | Status: SHIPPED | OUTPATIENT
Start: 2017-06-15 | End: 2017-06-22
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
## 2017-06-27 DIAGNOSIS — G89.29: ICD-10-CM

## 2017-06-27 DIAGNOSIS — H92.09: ICD-10-CM

## 2017-06-27 DIAGNOSIS — H66.3X9 CHRONIC SUPPURATIVE OTITIS MEDIA, UNSPECIFIED LATERALITY, UNSPECIFIED OTITIS MEDIA LOCATION: Primary | ICD-10-CM

## 2017-07-17 ENCOUNTER — HOSPITAL ENCOUNTER (OUTPATIENT)
Facility: HOSPITAL | Age: 32
Setting detail: OBSERVATION
Discharge: HOME | End: 2017-07-18
Attending: EMERGENCY MEDICINE | Admitting: SURGERY

## 2017-07-17 ENCOUNTER — ANESTHESIA EVENT (OUTPATIENT)
Dept: PERIOP | Facility: HOSPITAL | Age: 32
End: 2017-07-17

## 2017-07-17 ENCOUNTER — ANESTHESIA (OUTPATIENT)
Dept: PERIOP | Facility: HOSPITAL | Age: 32
End: 2017-07-17

## 2017-07-17 DIAGNOSIS — K61.1 PERIRECTAL ABSCESS: Primary | ICD-10-CM

## 2017-07-17 LAB
ANION GAP SERPL CALCULATED.3IONS-SCNC: 12 MMOL/L
BASOPHILS # BLD AUTO: 0.04 10*3/MM3 (ref 0–0.2)
BASOPHILS NFR BLD AUTO: 0.3 % (ref 0–2)
BUN BLD-MCNC: 15 MG/DL (ref 6–20)
BUN/CREAT SERPL: 15.2 (ref 7–25)
CALCIUM SPEC-SCNC: 9.1 MG/DL (ref 8.6–10.5)
CHLORIDE SERPL-SCNC: 106 MMOL/L (ref 98–107)
CO2 SERPL-SCNC: 25 MMOL/L (ref 22–29)
CREAT BLD-MCNC: 0.99 MG/DL (ref 0.76–1.27)
DEPRECATED RDW RBC AUTO: 33.8 FL (ref 37–54)
EOSINOPHIL # BLD AUTO: 0.31 10*3/MM3 (ref 0.1–0.3)
EOSINOPHIL NFR BLD AUTO: 2.3 % (ref 0–4)
ERYTHROCYTE [DISTWIDTH] IN BLOOD BY AUTOMATED COUNT: 11.4 % (ref 11.5–14.5)
GFR SERPL CREATININE-BSD FRML MDRD: 89 ML/MIN/1.73
GLUCOSE BLD-MCNC: 113 MG/DL (ref 65–99)
HCT VFR BLD AUTO: 41.6 % (ref 42–52)
HGB BLD-MCNC: 14.5 G/DL (ref 14–18)
IMM GRANULOCYTES # BLD: 0.02 10*3/MM3 (ref 0–0.03)
IMM GRANULOCYTES NFR BLD: 0.1 % (ref 0–0.5)
LYMPHOCYTES # BLD AUTO: 2.34 10*3/MM3 (ref 0.6–4.8)
LYMPHOCYTES NFR BLD AUTO: 17.5 % (ref 20–45)
MCH RBC QN AUTO: 28.7 PG (ref 27–31)
MCHC RBC AUTO-ENTMCNC: 34.9 G/DL (ref 31–37)
MCV RBC AUTO: 82.4 FL (ref 80–94)
MONOCYTES # BLD AUTO: 1.5 10*3/MM3 (ref 0–1)
MONOCYTES NFR BLD AUTO: 11.2 % (ref 3–8)
NEUTROPHILS # BLD AUTO: 9.13 10*3/MM3 (ref 1.5–8.3)
NEUTROPHILS NFR BLD AUTO: 68.6 % (ref 45–70)
NRBC BLD MANUAL-RTO: 0 /100 WBC (ref 0–0)
PLATELET # BLD AUTO: 301 10*3/MM3 (ref 140–500)
PMV BLD AUTO: 8.5 FL (ref 7.4–10.4)
POTASSIUM BLD-SCNC: 4.3 MMOL/L (ref 3.5–5.2)
RBC # BLD AUTO: 5.05 10*6/MM3 (ref 4.7–6.1)
SODIUM BLD-SCNC: 143 MMOL/L (ref 136–145)
WBC NRBC COR # BLD: 13.34 10*3/MM3 (ref 4.8–10.8)

## 2017-07-17 PROCEDURE — 87186 SC STD MICRODIL/AGAR DIL: CPT | Performed by: SURGERY

## 2017-07-17 PROCEDURE — 46060 I&D ISCHIORECTAL/NTRMRL ABSC: CPT | Performed by: SURGERY

## 2017-07-17 PROCEDURE — 96365 THER/PROPH/DIAG IV INF INIT: CPT

## 2017-07-17 PROCEDURE — 25010000002 PROPOFOL 10 MG/ML EMULSION: Performed by: NURSE ANESTHETIST, CERTIFIED REGISTERED

## 2017-07-17 PROCEDURE — 45300 PROCTOSIGMOIDOSCOPY DX: CPT | Performed by: SURGERY

## 2017-07-17 PROCEDURE — 25010000002 MIDAZOLAM PER 1 MG: Performed by: NURSE ANESTHETIST, CERTIFIED REGISTERED

## 2017-07-17 PROCEDURE — 25010000002 MORPHINE PER 10 MG: Performed by: SURGERY

## 2017-07-17 PROCEDURE — G0378 HOSPITAL OBSERVATION PER HR: HCPCS

## 2017-07-17 PROCEDURE — 87205 SMEAR GRAM STAIN: CPT | Performed by: SURGERY

## 2017-07-17 PROCEDURE — 25010000002 HYDROMORPHONE PER 4 MG: Performed by: NURSE ANESTHETIST, CERTIFIED REGISTERED

## 2017-07-17 PROCEDURE — S0260 H&P FOR SURGERY: HCPCS | Performed by: SURGERY

## 2017-07-17 PROCEDURE — 25010000002 PROMETHAZINE PER 50 MG: Performed by: SURGERY

## 2017-07-17 PROCEDURE — 96376 TX/PRO/DX INJ SAME DRUG ADON: CPT

## 2017-07-17 PROCEDURE — 25010000002 FENTANYL CITRATE (PF) 100 MCG/2ML SOLUTION: Performed by: NURSE ANESTHETIST, CERTIFIED REGISTERED

## 2017-07-17 PROCEDURE — 96374 THER/PROPH/DIAG INJ IV PUSH: CPT

## 2017-07-17 PROCEDURE — 25010000003 CEFAZOLIN PER 500 MG: Performed by: SURGERY

## 2017-07-17 PROCEDURE — 25010000002 ONDANSETRON PER 1 MG: Performed by: NURSE ANESTHETIST, CERTIFIED REGISTERED

## 2017-07-17 PROCEDURE — 80048 BASIC METABOLIC PNL TOTAL CA: CPT | Performed by: SURGERY

## 2017-07-17 PROCEDURE — 96375 TX/PRO/DX INJ NEW DRUG ADDON: CPT

## 2017-07-17 PROCEDURE — 25010000002 SUCCINYLCHOLINE PER 20 MG: Performed by: NURSE ANESTHETIST, CERTIFIED REGISTERED

## 2017-07-17 PROCEDURE — 25010000002 DEXAMETHASONE PER 1 MG: Performed by: NURSE ANESTHETIST, CERTIFIED REGISTERED

## 2017-07-17 PROCEDURE — 99283 EMERGENCY DEPT VISIT LOW MDM: CPT

## 2017-07-17 PROCEDURE — 87075 CULTR BACTERIA EXCEPT BLOOD: CPT | Performed by: SURGERY

## 2017-07-17 PROCEDURE — 87077 CULTURE AEROBIC IDENTIFY: CPT | Performed by: SURGERY

## 2017-07-17 PROCEDURE — 87185 SC STD ENZYME DETCJ PER NZM: CPT | Performed by: SURGERY

## 2017-07-17 PROCEDURE — 85025 COMPLETE CBC W/AUTO DIFF WBC: CPT | Performed by: SURGERY

## 2017-07-17 PROCEDURE — 25010000002 DIPHENHYDRAMINE PER 50 MG: Performed by: NURSE ANESTHETIST, CERTIFIED REGISTERED

## 2017-07-17 PROCEDURE — 99024 POSTOP FOLLOW-UP VISIT: CPT | Performed by: SURGERY

## 2017-07-17 PROCEDURE — 94799 UNLISTED PULMONARY SVC/PX: CPT

## 2017-07-17 PROCEDURE — 96361 HYDRATE IV INFUSION ADD-ON: CPT

## 2017-07-17 PROCEDURE — 99282 EMERGENCY DEPT VISIT SF MDM: CPT | Performed by: EMERGENCY MEDICINE

## 2017-07-17 PROCEDURE — 96367 TX/PROPH/DG ADDL SEQ IV INF: CPT

## 2017-07-17 PROCEDURE — 87070 CULTURE OTHR SPECIMN AEROBIC: CPT | Performed by: SURGERY

## 2017-07-17 PROCEDURE — 87076 CULTURE ANAEROBE IDENT EACH: CPT | Performed by: SURGERY

## 2017-07-17 RX ORDER — GLYCOPYRROLATE 0.2 MG/ML
INJECTION INTRAMUSCULAR; INTRAVENOUS AS NEEDED
Status: DISCONTINUED | OUTPATIENT
Start: 2017-07-17 | End: 2017-07-17 | Stop reason: SURG

## 2017-07-17 RX ORDER — DEXAMETHASONE SODIUM PHOSPHATE 4 MG/ML
4 INJECTION, SOLUTION INTRA-ARTICULAR; INTRALESIONAL; INTRAMUSCULAR; INTRAVENOUS; SOFT TISSUE ONCE AS NEEDED
Status: COMPLETED | OUTPATIENT
Start: 2017-07-17 | End: 2017-07-17

## 2017-07-17 RX ORDER — FENTANYL CITRATE 50 UG/ML
INJECTION, SOLUTION INTRAMUSCULAR; INTRAVENOUS AS NEEDED
Status: DISCONTINUED | OUTPATIENT
Start: 2017-07-17 | End: 2017-07-17 | Stop reason: SURG

## 2017-07-17 RX ORDER — FAMOTIDINE 10 MG/ML
20 INJECTION, SOLUTION INTRAVENOUS
Status: DISCONTINUED | OUTPATIENT
Start: 2017-07-17 | End: 2017-07-17 | Stop reason: HOSPADM

## 2017-07-17 RX ORDER — PROPOFOL 10 MG/ML
VIAL (ML) INTRAVENOUS AS NEEDED
Status: DISCONTINUED | OUTPATIENT
Start: 2017-07-17 | End: 2017-07-17 | Stop reason: SURG

## 2017-07-17 RX ORDER — MORPHINE SULFATE 2 MG/ML
2 INJECTION, SOLUTION INTRAMUSCULAR; INTRAVENOUS ONCE
Status: COMPLETED | OUTPATIENT
Start: 2017-07-17 | End: 2017-07-17

## 2017-07-17 RX ORDER — SODIUM CHLORIDE 0.9 % (FLUSH) 0.9 %
10 SYRINGE (ML) INJECTION AS NEEDED
Status: DISCONTINUED | OUTPATIENT
Start: 2017-07-17 | End: 2017-07-18 | Stop reason: HOSPADM

## 2017-07-17 RX ORDER — MAGNESIUM HYDROXIDE 1200 MG/15ML
LIQUID ORAL AS NEEDED
Status: DISCONTINUED | OUTPATIENT
Start: 2017-07-17 | End: 2017-07-17 | Stop reason: HOSPADM

## 2017-07-17 RX ORDER — MORPHINE SULFATE 2 MG/ML
2 INJECTION, SOLUTION INTRAMUSCULAR; INTRAVENOUS
Status: DISCONTINUED | OUTPATIENT
Start: 2017-07-17 | End: 2017-07-18 | Stop reason: HOSPADM

## 2017-07-17 RX ORDER — SUCCINYLCHOLINE CHLORIDE 20 MG/ML
INJECTION INTRAMUSCULAR; INTRAVENOUS AS NEEDED
Status: DISCONTINUED | OUTPATIENT
Start: 2017-07-17 | End: 2017-07-17 | Stop reason: SURG

## 2017-07-17 RX ORDER — MIDAZOLAM HYDROCHLORIDE 1 MG/ML
2 INJECTION INTRAMUSCULAR; INTRAVENOUS
Status: DISCONTINUED | OUTPATIENT
Start: 2017-07-17 | End: 2017-07-17 | Stop reason: HOSPADM

## 2017-07-17 RX ORDER — PANTOPRAZOLE SODIUM 20 MG/1
20 TABLET, DELAYED RELEASE ORAL EVERY MORNING
Status: DISCONTINUED | OUTPATIENT
Start: 2017-07-18 | End: 2017-07-18 | Stop reason: HOSPADM

## 2017-07-17 RX ORDER — DIPHENHYDRAMINE HYDROCHLORIDE 50 MG/ML
12.5 INJECTION INTRAMUSCULAR; INTRAVENOUS ONCE
Status: COMPLETED | OUTPATIENT
Start: 2017-07-17 | End: 2017-07-17

## 2017-07-17 RX ORDER — OXYCODONE HYDROCHLORIDE AND ACETAMINOPHEN 5; 325 MG/1; MG/1
1 TABLET ORAL EVERY 4 HOURS PRN
Status: DISCONTINUED | OUTPATIENT
Start: 2017-07-17 | End: 2017-07-18 | Stop reason: HOSPADM

## 2017-07-17 RX ORDER — SODIUM CHLORIDE, SODIUM LACTATE, POTASSIUM CHLORIDE, CALCIUM CHLORIDE 600; 310; 30; 20 MG/100ML; MG/100ML; MG/100ML; MG/100ML
125 INJECTION, SOLUTION INTRAVENOUS CONTINUOUS
Status: DISCONTINUED | OUTPATIENT
Start: 2017-07-17 | End: 2017-07-17

## 2017-07-17 RX ORDER — ACETAMINOPHEN 500 MG
1000 TABLET ORAL EVERY 6 HOURS PRN
COMMUNITY
End: 2021-08-20 | Stop reason: HOSPADM

## 2017-07-17 RX ORDER — OXYMETAZOLINE HYDROCHLORIDE 0.05 G/100ML
2 SPRAY NASAL EVERY 8 HOURS PRN
COMMUNITY

## 2017-07-17 RX ORDER — SODIUM CHLORIDE, SODIUM LACTATE, POTASSIUM CHLORIDE, CALCIUM CHLORIDE 600; 310; 30; 20 MG/100ML; MG/100ML; MG/100ML; MG/100ML
9 INJECTION, SOLUTION INTRAVENOUS CONTINUOUS PRN
Status: DISCONTINUED | OUTPATIENT
Start: 2017-07-17 | End: 2017-07-18 | Stop reason: HOSPADM

## 2017-07-17 RX ORDER — MEPERIDINE HYDROCHLORIDE 25 MG/ML
12.5 INJECTION INTRAMUSCULAR; INTRAVENOUS; SUBCUTANEOUS
Status: DISCONTINUED | OUTPATIENT
Start: 2017-07-17 | End: 2017-07-17 | Stop reason: HOSPADM

## 2017-07-17 RX ORDER — SODIUM CHLORIDE 0.9 % (FLUSH) 0.9 %
1-10 SYRINGE (ML) INJECTION AS NEEDED
Status: DISCONTINUED | OUTPATIENT
Start: 2017-07-17 | End: 2017-07-17 | Stop reason: HOSPADM

## 2017-07-17 RX ORDER — ONDANSETRON 2 MG/ML
4 INJECTION INTRAMUSCULAR; INTRAVENOUS ONCE AS NEEDED
Status: DISCONTINUED | OUTPATIENT
Start: 2017-07-17 | End: 2017-07-17 | Stop reason: HOSPADM

## 2017-07-17 RX ORDER — ROCURONIUM BROMIDE 10 MG/ML
INJECTION, SOLUTION INTRAVENOUS AS NEEDED
Status: DISCONTINUED | OUTPATIENT
Start: 2017-07-17 | End: 2017-07-17 | Stop reason: SURG

## 2017-07-17 RX ORDER — RANITIDINE HCL 75 MG
75 TABLET ORAL DAILY
COMMUNITY
End: 2021-07-21

## 2017-07-17 RX ORDER — OXYCODONE HYDROCHLORIDE AND ACETAMINOPHEN 5; 325 MG/1; MG/1
2 TABLET ORAL EVERY 4 HOURS PRN
Status: DISCONTINUED | OUTPATIENT
Start: 2017-07-17 | End: 2017-07-18 | Stop reason: HOSPADM

## 2017-07-17 RX ORDER — SODIUM CHLORIDE, SODIUM LACTATE, POTASSIUM CHLORIDE, CALCIUM CHLORIDE 600; 310; 30; 20 MG/100ML; MG/100ML; MG/100ML; MG/100ML
50 INJECTION, SOLUTION INTRAVENOUS CONTINUOUS
Status: DISCONTINUED | OUTPATIENT
Start: 2017-07-17 | End: 2017-07-17

## 2017-07-17 RX ORDER — HYDROMORPHONE HCL 110MG/55ML
PATIENT CONTROLLED ANALGESIA SYRINGE INTRAVENOUS
Status: DISPENSED
Start: 2017-07-17 | End: 2017-07-18

## 2017-07-17 RX ORDER — ONDANSETRON 2 MG/ML
4 INJECTION INTRAMUSCULAR; INTRAVENOUS ONCE AS NEEDED
Status: COMPLETED | OUTPATIENT
Start: 2017-07-17 | End: 2017-07-17

## 2017-07-17 RX ORDER — MIDAZOLAM HYDROCHLORIDE 1 MG/ML
1 INJECTION INTRAMUSCULAR; INTRAVENOUS
Status: DISCONTINUED | OUTPATIENT
Start: 2017-07-17 | End: 2017-07-17 | Stop reason: HOSPADM

## 2017-07-17 RX ORDER — CEFAZOLIN SODIUM 1 G/3ML
INJECTION, POWDER, FOR SOLUTION INTRAMUSCULAR; INTRAVENOUS
Status: DISPENSED
Start: 2017-07-17 | End: 2017-07-17

## 2017-07-17 RX ORDER — HYDROMORPHONE HCL 110MG/55ML
1 PATIENT CONTROLLED ANALGESIA SYRINGE INTRAVENOUS
Status: DISCONTINUED | OUTPATIENT
Start: 2017-07-17 | End: 2017-07-17 | Stop reason: HOSPADM

## 2017-07-17 RX ORDER — LIDOCAINE HYDROCHLORIDE 20 MG/ML
INJECTION, SOLUTION INFILTRATION; PERINEURAL AS NEEDED
Status: DISCONTINUED | OUTPATIENT
Start: 2017-07-17 | End: 2017-07-17 | Stop reason: SURG

## 2017-07-17 RX ADMIN — FENTANYL CITRATE 50 MCG: 50 INJECTION, SOLUTION INTRAMUSCULAR; INTRAVENOUS at 11:06

## 2017-07-17 RX ADMIN — MORPHINE SULFATE 2 MG: 2 INJECTION, SOLUTION INTRAMUSCULAR; INTRAVENOUS at 03:36

## 2017-07-17 RX ADMIN — MIDAZOLAM HYDROCHLORIDE 1 MG: 1 INJECTION, SOLUTION INTRAMUSCULAR; INTRAVENOUS at 10:57

## 2017-07-17 RX ADMIN — SODIUM CHLORIDE, PRESERVATIVE FREE 6.25 MG: 5 INJECTION INTRAVENOUS at 13:54

## 2017-07-17 RX ADMIN — SODIUM CHLORIDE, POTASSIUM CHLORIDE, SODIUM LACTATE AND CALCIUM CHLORIDE 9 ML/HR: 600; 310; 30; 20 INJECTION, SOLUTION INTRAVENOUS at 10:14

## 2017-07-17 RX ADMIN — METRONIDAZOLE 500 MG: 500 INJECTION, SOLUTION INTRAVENOUS at 03:36

## 2017-07-17 RX ADMIN — MORPHINE SULFATE 2 MG: 2 INJECTION, SOLUTION INTRAMUSCULAR; INTRAVENOUS at 08:05

## 2017-07-17 RX ADMIN — LIDOCAINE HYDROCHLORIDE 100 MG: 20 INJECTION, SOLUTION INFILTRATION; PERINEURAL at 11:06

## 2017-07-17 RX ADMIN — PROPOFOL 200 MG: 10 INJECTION, EMULSION INTRAVENOUS at 11:07

## 2017-07-17 RX ADMIN — FAMOTIDINE 20 MG: 10 INJECTION, SOLUTION INTRAVENOUS at 10:27

## 2017-07-17 RX ADMIN — CEFAZOLIN SODIUM 2 G: 2 SOLUTION INTRAVENOUS at 19:39

## 2017-07-17 RX ADMIN — DEXAMETHASONE SODIUM PHOSPHATE 4 MG: 4 INJECTION, SOLUTION INTRAMUSCULAR; INTRAVENOUS at 10:27

## 2017-07-17 RX ADMIN — MORPHINE SULFATE 2 MG: 2 INJECTION, SOLUTION INTRAMUSCULAR; INTRAVENOUS at 05:58

## 2017-07-17 RX ADMIN — MORPHINE SULFATE 2 MG: 2 INJECTION, SOLUTION INTRAMUSCULAR; INTRAVENOUS at 01:31

## 2017-07-17 RX ADMIN — FENTANYL CITRATE 50 MCG: 50 INJECTION, SOLUTION INTRAMUSCULAR; INTRAVENOUS at 11:07

## 2017-07-17 RX ADMIN — HYDROMORPHONE HYDROCHLORIDE 1 MG: 2 INJECTION, SOLUTION INTRAMUSCULAR; INTRAVENOUS; SUBCUTANEOUS at 12:33

## 2017-07-17 RX ADMIN — SODIUM CHLORIDE, POTASSIUM CHLORIDE, SODIUM LACTATE AND CALCIUM CHLORIDE 125 ML/HR: 600; 310; 30; 20 INJECTION, SOLUTION INTRAVENOUS at 02:01

## 2017-07-17 RX ADMIN — MIDAZOLAM HYDROCHLORIDE 2 MG: 1 INJECTION, SOLUTION INTRAMUSCULAR; INTRAVENOUS at 10:27

## 2017-07-17 RX ADMIN — ONDANSETRON 4 MG: 2 INJECTION, SOLUTION INTRAMUSCULAR; INTRAVENOUS at 10:27

## 2017-07-17 RX ADMIN — OXYCODONE HYDROCHLORIDE AND ACETAMINOPHEN 2 TABLET: 5; 325 TABLET ORAL at 17:49

## 2017-07-17 RX ADMIN — ROCURONIUM BROMIDE 5 MG: 10 INJECTION INTRAVENOUS at 11:06

## 2017-07-17 RX ADMIN — METRONIDAZOLE 500 MG: 500 INJECTION, SOLUTION INTRAVENOUS at 11:12

## 2017-07-17 RX ADMIN — SODIUM CHLORIDE, PRESERVATIVE FREE 6.25 MG: 5 INJECTION INTRAVENOUS at 06:46

## 2017-07-17 RX ADMIN — GLYCOPYRROLATE 0.2 MG: 0.2 INJECTION INTRAMUSCULAR; INTRAVENOUS at 11:04

## 2017-07-17 RX ADMIN — METRONIDAZOLE 500 MG: 500 INJECTION, SOLUTION INTRAVENOUS at 17:50

## 2017-07-17 RX ADMIN — HYDROMORPHONE HYDROCHLORIDE 1 MG: 2 INJECTION, SOLUTION INTRAMUSCULAR; INTRAVENOUS; SUBCUTANEOUS at 12:15

## 2017-07-17 RX ADMIN — SUCCINYLCHOLINE CHLORIDE 140 MG: 20 INJECTION, SOLUTION INTRAMUSCULAR; INTRAVENOUS at 11:08

## 2017-07-17 RX ADMIN — DIPHENHYDRAMINE HYDROCHLORIDE 12.5 MG: 50 INJECTION, SOLUTION INTRAMUSCULAR; INTRAVENOUS at 10:27

## 2017-07-17 RX ADMIN — OXYCODONE HYDROCHLORIDE AND ACETAMINOPHEN 2 TABLET: 5; 325 TABLET ORAL at 21:43

## 2017-07-17 RX ADMIN — CEFAZOLIN SODIUM 2 G: 2 SOLUTION INTRAVENOUS at 02:51

## 2017-07-17 NOTE — ED NOTES
"Chief Complaint   Patient presents with   • Abscess     Blood pressure (!) 149/106, pulse 86, temperature 97.7 °F (36.5 °C), temperature source Oral, resp. rate 18, height 71\" (180.3 cm), weight 250 lb (113 kg), SpO2 100 %.      The patient presents to room 6 complaining of an abscess to his perirectal area.  Reports that the pain started one week ago and has increased in intensity.  He is now unable to sit on his right buttocks.  Denies fever, chills, nausea or vomiting.  Denies ever having an abscess to his higinio area before.  Recalls having a tonsillar abscess and then having his tonsils removed.      Kristen Wiggins RN  07/17/17 0144    "

## 2017-07-17 NOTE — PLAN OF CARE
Problem: Perioperative Period (Adult)  Goal: Signs and Symptoms of Listed Potential Problems Will be Absent or Manageable (Perioperative Period)  Outcome: Ongoing (interventions implemented as appropriate)    07/17/17 1232   Perioperative Period   Problems Assessed (Perioperative Period) all   Problems Present (Perioperative Period) pain;physiologic stress response

## 2017-07-17 NOTE — ANESTHESIA POSTPROCEDURE EVALUATION
Patient: Luis Miguel Carrera    Procedure Summary     Date Anesthesia Start Anesthesia Stop Room / Location    07/17/17 1101 1148 [facility] / [facility]       Procedure Diagnosis Surgeon Provider    INCISION AND DRAINAGE OF PERIRECTAL ABSCESS (Left Perirectal); SIGMOIDOSCOPY RIGID (N/A ) Perirectal abscess  (Perirectal abscess [K61.1]) MD Deion Amador CRNA          Anesthesia Type: general  Last vitals  BP      Temp      Pulse     Resp      SpO2        Post Anesthesia Care and Evaluation    Patient location during evaluation: PACU  Patient participation: complete - patient participated  Level of consciousness: awake and alert  Pain score: 0  Pain management: adequate  Airway patency: patent  Anesthetic complications: No anesthetic complications    Cardiovascular status: acceptable  Respiratory status: acceptable  Hydration status: acceptable

## 2017-07-17 NOTE — H&P
Chief complaint perirectal pain  History of present illness this 31-year-old white male complains a 5 day history of perirectal pain that has increased.  He denies fevers or chills he denies any drainage from the site he denies any prior symptoms similar to this.  He denies any recent diarrhea.  He denies any inflammatory bowel disease.  Family history is significant for Crohn's disease in his father  Past medical history is essentially negative.  He denies allergies to medications  Social history he is a former smoker and drinks alcohol socially.  Review of systems is significant for some dark urine this past week.  He has decreased his oral intake.  Otherwise negative.  Physical exam is an overweight white male in no active distress.  His heart shows a regular regular rate and rhythm his lungs are clear and equal.  His abdominal exam is benign.  He has no inguinal adenopathy.  The left perirectal area he has marked induration.  His white blood count is elevated 13.3 for hemoglobin is 14.5 his glucose is mildly elevated to 113.  Assessment perirectal abscess possible fistula in ano  Plan intravenous antibiotics the risks benefits and options were discussed with patient in detail we will proceed with a rigid sigmoidoscopy incision and drainage perirectal abscess possible seton placement.

## 2017-07-17 NOTE — OP NOTE
Preoperative diagnosis perirectal abscess  postoperative diagnosis perirectal abscess fistula in ano  Procedure rigid sigmoidoscopy and incision and drainage perirectal abscess placement of seton  Complications none  Drains none  Specimen cultures to pathology  Estimated blood loss 25 cc  Anesthesia Gen. via endotracheal tube  Surgeon Dr. Hair  Findings large perirectal abscess and fistula and ano  Procedure after satisfactory induction of general anesthesia via endotracheal tube the patient was laid prone jackknife on the operating room table.  Rigid sigmoidoscopy was performed to 15 cm.  There is no evidence of any inflammatory bowel disease.  He did have some internal hemorrhoids and some mild inflammation on the lateral sidewall of his anorectal junction.  His buttocks were taped apart and the perianal area was prepped and draped in a sterile fashion.  He had an area of marked induration at the 9 o'clock position.  18-gauge needle was used to percutaneously aspirate this and pus was obtained.  This was cultured aerobically and anaerobically.  Radial incision was made overlying this  carried down into a large perirectal abscess.  This was suctioned and then irrigated.  Anus was dilated to a medium Hill-Mendez retractor and a flexible probe placed within the cavity and came out an internal opening directly adjacent to the cavity.  This appeared to be partially transsphincteric.  0 Prolene suture was placed through the eye the flexible probe and the Prolene suture was brought out the radial incision and the anus.  It was tied at 1 cm intervals.  18 Arabic red rubber catheter was cut to length was placed overlying the suture and was held in place with a sterile safety pin.  Abscess was packed with use of quarter inch iodoform gauze.  The patient tolerated the procedure well and was transferred to the recovery room after a sterile dressing was applied.  He'll be admitted for further care.

## 2017-07-17 NOTE — PLAN OF CARE
Problem: Patient Care Overview (Adult)  Goal: Plan of Care Review  Outcome: Ongoing (interventions implemented as appropriate)    07/17/17 0957   Coping/Psychosocial Response Interventions   Plan Of Care Reviewed With patient   Patient Care Overview   Progress no change   Outcome Evaluation   Outcome Summary/Follow up Plan VSS, C/O PAIN, AWAITING ANESTHESIA THEN READY FOR PROCEDURE       Goal: Adult Individualization and Mutuality  Outcome: Ongoing (interventions implemented as appropriate)    Problem: Perioperative Period (Adult)  Goal: Signs and Symptoms of Listed Potential Problems Will be Absent or Manageable (Perioperative Period)  Outcome: Ongoing (interventions implemented as appropriate)

## 2017-07-17 NOTE — ANESTHESIA PREPROCEDURE EVALUATION
Anesthesia Evaluation     Patient summary reviewed and Nursing notes reviewed   history of anesthetic complications: PONV  NPO Solid Status: > 8 hours  NPO Liquid Status: > 8 hours     Airway   Mallampati: II  TM distance: >3 FB  Neck ROM: full  no difficulty expected  Dental - normal exam     Pulmonary - normal exam    breath sounds clear to auscultation  (+) a smoker (quit 5 years ago) Former,   Cardiovascular - negative cardio ROS and normal exam    Rhythm: regular  Rate: normal        Neuro/Psych  (+) psychiatric history Anxiety,    GI/Hepatic/Renal/Endo    (+) obesity,  GERD well controlled,     Musculoskeletal (-) negative ROS    Abdominal   (+) obese,    Substance History   Alcohol use: occ.     OB/GYN negative ob/gyn ROS         Other - negative ROS                                     Anesthesia Plan    ASA 2     general     intravenous induction   Anesthetic plan and risks discussed with patient.  Use of blood products discussed with patient  Consented to blood products.

## 2017-07-17 NOTE — ED PROVIDER NOTES
"Subjective   Patient is a 31 y.o. male presenting with abscess.   History provided by:  Patient  Abscess   Location:  Pelvis  Pelvic abscess location:  Perianal  Size:  5 cm  Abscess quality: induration, painful, redness and warmth    Red streaking: no    Duration:  1 week  Progression:  Worsening  Pain details:     Severity:  Moderate    Duration:  1 week    Timing:  Constant    Progression:  Worsening  Chronicity:  New  Context comment:  As described below.  Associated symptoms: no anorexia, no fatigue, no fever, no headaches, no nausea and no vomiting    Risk factors: no family hx of MRSA, no hx of MRSA and no prior abscess      HPI Narrative:Luis Miguel is a 32 yo WM who presents secondary to an abscess near his rectum. Onset of symptoms approximately 1 week ago.  Patient initially noticed some discomfort in the left side of his rectum.  Patient thought he had perhaps \"pulled muscle\".  However patient's symptoms have progressively worsened over the course of 1 week.  He is now having significant pain which is worsened in the sitting position.  Patient presents for evaluation.        Review of Systems   Constitutional: Negative for chills, diaphoresis, fatigue and fever.   HENT: Negative for congestion, ear pain and sore throat.    Eyes: Negative for pain and discharge.   Respiratory: Negative for chest tightness, shortness of breath, wheezing and stridor.    Cardiovascular: Negative for chest pain, palpitations and leg swelling.   Gastrointestinal: Positive for hematochezia. Negative for abdominal pain, anorexia, diarrhea, nausea and vomiting.   Genitourinary: Negative for dysuria, flank pain, frequency and hematuria.   Musculoskeletal: Negative for back pain, myalgias, neck pain and neck stiffness.   Skin: Negative for color change, pallor and rash.   Neurological: Negative for dizziness, seizures, syncope and headaches.   Psychiatric/Behavioral: Negative for agitation and confusion. The patient is not " nervous/anxious.    All other systems reviewed and are negative.      Past Medical History:   Diagnosis Date   • Abdominal bloating    • Anxiety    • Depression    • Ear infection     frequent ear infections   • Hand fracture, right    • Lower abdominal pain    • Nausea and vomiting    • Obsessive compulsive disorder        No Known Allergies    Past Surgical History:   Procedure Laterality Date   • ORIF FINGER FRACTURE Right 2/17/2017    Procedure: PERCUTANOUS FIXATION OF SMALL FINGER METACARPAL FRACTURE RT HAND;  Surgeon: Cameron Mathew MD;  Location: [facility];  Service:    • TONSILLECTOMY     • TONSILLECTOMY  2009       Family History   Problem Relation Age of Onset   • Alcohol abuse Mother    • Heart disease Mother    • Hypertension Mother    • Mental illness Mother    • Hypertension Father    • Heart disease Father    • Alcohol abuse Father    • Crohn's disease Father    • Schizophrenia Father    • Drug abuse Father    • Suicide Attempts Father    • Mental illness Father    • Mental illness Sister    • Bipolar disorder Sister    • OCD Sister        Social History     Social History   • Marital status:      Spouse name: N/A   • Number of children: N/A   • Years of education: N/A     Social History Main Topics   • Smoking status: Former Smoker     Packs/day: 1.00     Years: 15.00     Types: Cigarettes     Quit date: 2012   • Smokeless tobacco: Former User     Types: Chew     Quit date: 1/1/2014   • Alcohol use 0.6 oz/week     1 Cans of beer per week      Comment: Occasional   • Drug use: No   • Sexual activity: Defer     Other Topics Concern   • None     Social History Narrative           Objective   Physical Exam   Constitutional: He is oriented to person, place, and time. He appears well-developed and well-nourished. No distress.   32 yo WM lying in bed. He is a bit obese but otherwise appears in good health.   Genitourinary: Rectal exam shows tenderness. Rectal exam shows no external hemorrhoid  and no internal hemorrhoid.         Neurological: He is alert and oriented to person, place, and time.   Skin: He is not diaphoretic.   Psychiatric: He has a normal mood and affect. His behavior is normal.   Nursing note and vitals reviewed.      Procedures         ED Course  ED Course   Comment By Time   07/17/17  1:41 AM  Patient has perirectal abscess.  This will require surgical drainage.  Calling Dr. Hair. Rohit Medina MD 07/17 0242 07/17/17  2:01 AM  Dr. Hair will hospitalize patient.  He is planning surgical I&D in the morning. Patient has been informed of this.  IV has been placed and baseline lab work obtained.  Patient given morphine 2 mg IV for comfort.  Patient feeling better. Rohit Medina MD 07/17 0243      Labs Reviewed   BASIC METABOLIC PANEL - Abnormal; Notable for the following:        Result Value    Glucose 113 (*)     All other components within normal limits    Narrative:     GFR Normal >60  Chronic Kidney Disease <60  Kidney Failure <15   CBC WITH AUTO DIFFERENTIAL - Abnormal; Notable for the following:     WBC 13.34 (*)     Hematocrit 41.6 (*)     RDW 11.4 (*)     RDW-SD 33.8 (*)     Lymphocyte % 17.5 (*)     Monocyte % 11.2 (*)     Neutrophils, Absolute 9.13 (*)     Monocytes, Absolute 1.50 (*)     Eosinophils, Absolute 0.31 (*)     All other components within normal limits   CBC AND DIFFERENTIAL    Narrative:     The following orders were created for panel order CBC & Differential.  Procedure                               Abnormality         Status                     ---------                               -----------         ------                     CBC Auto Differential[[id number]]        Abnormal            Final result                 Please view results for these tests on the individual orders.                 MDM  Number of Diagnoses or Management Options  Perirectal abscess: new and requires workup     Amount and/or Complexity of Data Reviewed  Clinical lab tests:  ordered and reviewed    Risk of Complications, Morbidity, and/or Mortality  Presenting problems: moderate  Diagnostic procedures: low  Management options: moderate    Patient Progress  Patient progress: stable      Final diagnoses:   Perirectal abscess            Rohit Medina MD  07/17/17 0244

## 2017-07-17 NOTE — PROGRESS NOTES
Postop check he is stable.  His operation was discussed with he and his wife.  He will need a second procedure for the fistula.

## 2017-07-17 NOTE — PLAN OF CARE
Problem: Pain, Acute (Adult)  Goal: Identify Related Risk Factors and Signs and Symptoms  Outcome: Ongoing (interventions implemented as appropriate)    07/17/17 0227   Pain, Acute   Related Risk Factors (Acute Pain) patient perception   Signs and Symptoms (Acute Pain) verbalization of pain descriptors       Goal: Acceptable Pain Control/Comfort Level  Outcome: Ongoing (interventions implemented as appropriate)    07/17/17 0227   Pain, Acute (Adult)   Acceptable Pain Control/Comfort Level making progress toward outcome

## 2017-07-17 NOTE — ANESTHESIA PROCEDURE NOTES
Airway  Urgency: elective    Date/Time: 7/17/2017 11:10 AM  Airway not difficult    General Information and Staff    Patient location during procedure: OR    Indications and Patient Condition  Indications for airway management: airway protection    Preoxygenated: yes  MILS maintained throughout  Mask difficulty assessment: 1 - vent by mask    Final Airway Details  Final airway type: endotracheal airway      Successful airway: ETT  Cuffed: yes   Successful intubation technique: direct laryngoscopy  Facilitating devices/methods: intubating stylet and cricoid pressure  Endotracheal tube insertion site: oral  Blade: Ewing  Blade size: #3  ETT size: 8.0 mm  Cormack-Lehane Classification: grade IIa - partial view of glottis  Placement verified by: chest auscultation and capnometry   Inital cuff pressure (cm H2O): 0  Measured from: lips  ETT to lips (cm): 23  Number of attempts at approach: 1

## 2017-07-18 VITALS
SYSTOLIC BLOOD PRESSURE: 108 MMHG | TEMPERATURE: 98.7 F | HEART RATE: 75 BPM | BODY MASS INDEX: 37.11 KG/M2 | HEIGHT: 71 IN | RESPIRATION RATE: 18 BRPM | DIASTOLIC BLOOD PRESSURE: 72 MMHG | WEIGHT: 265.06 LBS | OXYGEN SATURATION: 98 %

## 2017-07-18 PROCEDURE — G0378 HOSPITAL OBSERVATION PER HR: HCPCS

## 2017-07-18 PROCEDURE — 99024 POSTOP FOLLOW-UP VISIT: CPT | Performed by: SURGERY

## 2017-07-18 PROCEDURE — 25010000003 CEFAZOLIN PER 500 MG: Performed by: SURGERY

## 2017-07-18 RX ADMIN — METRONIDAZOLE 500 MG: 500 INJECTION, SOLUTION INTRAVENOUS at 09:48

## 2017-07-18 RX ADMIN — CEFAZOLIN SODIUM 2 G: 2 SOLUTION INTRAVENOUS at 11:00

## 2017-07-18 RX ADMIN — CEFAZOLIN SODIUM 2 G: 2 SOLUTION INTRAVENOUS at 03:48

## 2017-07-18 RX ADMIN — METRONIDAZOLE 500 MG: 500 INJECTION, SOLUTION INTRAVENOUS at 02:35

## 2017-07-18 RX ADMIN — CEFAZOLIN SODIUM 2 G: 2 SOLUTION INTRAVENOUS at 09:48

## 2017-07-18 RX ADMIN — PANTOPRAZOLE SODIUM 20 MG: 40 TABLET, DELAYED RELEASE ORAL at 06:25

## 2017-07-18 RX ADMIN — OXYCODONE HYDROCHLORIDE AND ACETAMINOPHEN 2 TABLET: 5; 325 TABLET ORAL at 02:40

## 2017-07-18 NOTE — DISCHARGE SUMMARY
Admission date 7/17/17  Discharge date 7/18/17  Admission diagnosis perirectal abscess  Discharge diagnosis perirectal abscess and fistula in ano  Procedure rigid sigmoidoscopy drainage of perirectal abscess placement of seton  Prognosis fair  Disposition fair  Activity no heavy lifting ×1 week  Follow up in my office in 6 days  Discharge medications Percocet 5/325 one to 2 by mouth every 4 hours when necessary pain 30 these were dispensed without refills, Keflex 500 mg by mouth 3 times a day 18 were dispensed with no refills, Flagyl 500 mg by mouth 3 times a day 18 were dispensed without refills.  The patient can continue on his prehospitalization medication except he should not continue any hydrocodone that he has nor ibuprofen.  Wound care his wife is to change his packing daily with iodoform gauze and adhesed to do a daily sitz baths while the packing is out of the wound.  Hospital course patient was admitted from the emergency room with a diagnosis perirectal abscess.  He was taken the operating room a rigid sigmoidoscopy showed no evidence of inflammatory bowel disease.  He had a large perirectal abscess, was drained and he had evidence of a transsphincteric  fistula in ano.  Postoperatively he has done well.  His wife has been instructed in the wound care and she is comfortable doing that.  He tolerated the dressing change with by mouth pain medication.  He'll be discharged home today to follow-up in my office in 6 days to call for problems.  He will be discharged on the aforementioned medication and wound care regimen.

## 2017-07-18 NOTE — PROGRESS NOTES
Patient: Luis Miguel Carrera  Procedure(s) with comments:  INCISION AND DRAINAGE OF PERIRECTAL ABSCESS - Perirectal abscess, fistula in ano.  SIGMOIDOSCOPY RIGID - Perirectal abscess, fistula in ano  Anesthesia type: [unfilled]    Patient location: [facility] Surgical Floor  Vitals:    07/17/17 1921 07/17/17 2030 07/17/17 2318 07/18/17 0649   BP: 128/69  126/58 108/72   BP Location: Left arm  Left arm Right arm   Patient Position: Lying  Lying Lying   Pulse: 87  75 75   Resp: 18  18 18   Temp: 98.7 °F (37.1 °C)  98.6 °F (37 °C) 98.7 °F (37.1 °C)   TempSrc: Oral  Oral Oral   SpO2: 97% 98% 96% 98%   Weight:       Height:         Level of consciousness: awake, alert and oriented    Post-anesthesia pain: adequate analgesia  Airway patency: patent  Respiratory: unassisted  Cardiovascular: stable and blood pressure at baseline  Hydration: euvolemic    Anesthetic complications: no

## 2017-07-18 NOTE — PROGRESS NOTES
Without complaints.  He is urinating without problems.  He tolerated his dressing change this morning.  He will be discharged home to follow-up in my office on Monday to call for problems.  I'll write him prescription for antibiotics and pain medication.

## 2017-07-19 ENCOUNTER — HOSPITAL ENCOUNTER (EMERGENCY)
Facility: HOSPITAL | Age: 32
Discharge: HOME | End: 2017-07-19
Attending: EMERGENCY MEDICINE | Admitting: EMERGENCY MEDICINE

## 2017-07-19 VITALS
DIASTOLIC BLOOD PRESSURE: 84 MMHG | OXYGEN SATURATION: 97 % | HEART RATE: 74 BPM | HEIGHT: 71 IN | SYSTOLIC BLOOD PRESSURE: 147 MMHG | BODY MASS INDEX: 37.8 KG/M2 | RESPIRATION RATE: 16 BRPM | WEIGHT: 270 LBS | TEMPERATURE: 98 F

## 2017-07-19 DIAGNOSIS — IMO0001 WOUND ABSCESS, SUBSEQUENT ENCOUNTER: Primary | ICD-10-CM

## 2017-07-19 PROCEDURE — 99282 EMERGENCY DEPT VISIT SF MDM: CPT | Performed by: EMERGENCY MEDICINE

## 2017-07-19 PROCEDURE — 99283 EMERGENCY DEPT VISIT LOW MDM: CPT

## 2017-07-19 RX ORDER — OXYCODONE HYDROCHLORIDE AND ACETAMINOPHEN 5; 325 MG/1; MG/1
2 TABLET ORAL EVERY 6 HOURS PRN
COMMUNITY
End: 2017-07-24

## 2017-07-19 NOTE — ED PROVIDER NOTES
Subjective     History provided by:  Patient    History of Present Illness    · Chief complaint: Perirectal abscess wound that needs to be repacked    · Location: Perirectal    · Quality/Severity: The patient has a perirectal abscess that was surgically drained by Dr. Hair 2 days ago.  A red rubber drain was placed in the wound was packed with quarter-inch iodoform gauze.  The patient's wife was instructed how to remove an repacked the course iodoform gauze, but due to the patient poorly tolerating her attempts to do so, she was unable to repack it.     · Timing/Onset: Perirectal abscess drained on July 17.    · Modifying Factors: Patient has discomfort with his wife's attempt to repack the abscess.    · Associated symptoms: Pt denies fever    · Narrative: The patient is a 31-year-old white male who had a perirectal abscess drained on July 17 by Dr. Hair.  He was discharged home on July 18 with his wife been instructed how to repack the abscess was quarter-inch iodoform gauze.  The patient's wife attempted to do so last night and again this morning, but the patient was unable to tolerate it due to discomfort and she became nervous.  He presents the emergency department wanting it repacked.  They have arranged for home health care to start tomorrow repacking the wound.  His next appointment with Dr. Hair is on Monday.    ED Triage Vitals   Temp Heart Rate Resp BP SpO2   07/19/17 0954 07/19/17 0954 07/19/17 0954 07/19/17 0954 --   98 °F (36.7 °C) 88 18 160/104       Temp src Heart Rate Source Patient Position BP Location FiO2 (%)   07/19/17 0954 07/19/17 0954 07/19/17 0954 07/19/17 0954 --   Oral Monitor Lying Right arm        Review of Systems   Constitutional: Negative for chills, diaphoresis and fever.   Gastrointestinal: Negative for abdominal pain, nausea and vomiting.   Genitourinary: Negative for difficulty urinating.   Musculoskeletal: Negative for back pain, neck pain and neck stiffness.   Skin: Negative for  color change and rash.   Neurological: Negative for dizziness and headaches.   Psychiatric/Behavioral: Negative for agitation. The patient is not nervous/anxious.        Past Medical History:   Diagnosis Date   • Abdominal bloating    • Anxiety    • Depression    • Ear infection     frequent ear infections   • Hand fracture, right    • Lower abdominal pain    • Nausea and vomiting    • Obsessive compulsive disorder        No Known Allergies    Past Surgical History:   Procedure Laterality Date   • INCISION AND DRAINAGE PERIRECTAL ABSCESS Left 7/17/2017    Procedure: INCISION AND DRAINAGE OF PERIRECTAL ABSCESS;  Surgeon: Cameron Hair MD;  Location:  [facility] OR;  Service:    • ORIF FINGER FRACTURE Right 2/17/2017    Procedure: PERCUTANOUS FIXATION OF SMALL FINGER METACARPAL FRACTURE RT HAND;  Surgeon: Cameron Mathew MD;  Location:  [facility];  Service:    • SIGMOIDOSCOPY N/A 7/17/2017    Procedure: SIGMOIDOSCOPY RIGID;  Surgeon: Cameron Hair MD;  Location: [facility] OR;  Service:    • TONSILLECTOMY     • TONSILLECTOMY  2009       Family History   Problem Relation Age of Onset   • Alcohol abuse Mother    • Heart disease Mother    • Hypertension Mother    • Mental illness Mother    • Hypertension Father    • Heart disease Father    • Alcohol abuse Father    • Crohn's disease Father    • Schizophrenia Father    • Drug abuse Father    • Suicide Attempts Father    • Mental illness Father    • Mental illness Sister    • Bipolar disorder Sister    • OCD Sister        Social History     Social History   • Marital status:      Spouse name: N/A   • Number of children: N/A   • Years of education: N/A     Social History Main Topics   • Smoking status: Former Smoker     Packs/day: 1.00     Years: 15.00     Types: Cigarettes     Quit date: 2012   • Smokeless tobacco: Former User     Types: Chew     Quit date: 1/1/2014   • Alcohol use 0.6 oz/week     1 Cans of beer per week      Comment: Occasional   • Drug use: No   •  Sexual activity: Defer     Other Topics Concern   • None     Social History Narrative           Objective   Physical Exam   Constitutional: He is oriented to person, place, and time. He appears well-developed and well-nourished. No distress.   Genitourinary:   Genitourinary Comments: The patient has a red rubber drain protruding from a surgical wound adjacent to the rectum where the perirectal abscess was drained.  There is some drainage of a brownish watery liquid from the wound.  There is no peripheral cellulitis.   Neurological: He is alert and oriented to person, place, and time. No cranial nerve deficit.   No focal motor sensory deficit   Skin: Skin is warm. No rash noted. He is not diaphoretic. No erythema. No pallor.   Psychiatric: He has a normal mood and affect. His behavior is normal. Judgment and thought content normal.   Nursing note and vitals reviewed.      Procedures         ED Course  ED Course   Comment By Time   I repacked the patient's perirectal abscess by first anesthetizing the area with lidocaine 2% with epinephrine buffered with Neut.  I then packed approximately 15 cm of quarter-inch iodoform gauze into the wound.  There were no complications. Cameron Tsai MD 07/19 1017   The patient was discussed with Dr. Reginaldo Tsai MD 07/19 1017   The patient states that he has home health coming tomorrow to start daily repacking of the wound. Cameron Tsai MD 07/19 1018                  MDM  Number of Diagnoses or Management Options  Wound abscess, subsequent encounter:   Diagnosis management comments: Established       Amount and/or Complexity of Data Reviewed  Discuss the patient with other providers: yes    Risk of Complications, Morbidity, and/or Mortality  Presenting problems: low  Diagnostic procedures: minimal  Management options: low    Patient Progress  Patient progress: improved      Final diagnoses:   Wound abscess, subsequent encounter           Labs Reviewed - No data to  display  No orders to display          Medication List      Changed          pantoprazole 20 MG EC tablet   Commonly known as:  PROTONIX   Take 1 tablet by mouth Daily.   What changed:  when to take this                Cameron Tsai MD  07/19/17 5217

## 2017-07-20 LAB
BACTERIA SPEC AEROBE CULT: ABNORMAL
GRAM STN SPEC: ABNORMAL
GRAM STN SPEC: ABNORMAL

## 2017-07-20 RX ORDER — LIDOCAINE 50 MG/G
OINTMENT TOPICAL 3 TIMES DAILY
Qty: 50 G | Refills: 0 | Status: SHIPPED | OUTPATIENT
Start: 2017-07-20 | End: 2017-09-07

## 2017-07-22 ENCOUNTER — HOSPITAL ENCOUNTER (EMERGENCY)
Facility: HOSPITAL | Age: 32
Discharge: HOME | End: 2017-07-22
Attending: EMERGENCY MEDICINE | Admitting: EMERGENCY MEDICINE

## 2017-07-22 VITALS
RESPIRATION RATE: 16 BRPM | TEMPERATURE: 98.1 F | DIASTOLIC BLOOD PRESSURE: 116 MMHG | OXYGEN SATURATION: 96 % | BODY MASS INDEX: 37.22 KG/M2 | HEIGHT: 70 IN | WEIGHT: 260 LBS | HEART RATE: 77 BPM | SYSTOLIC BLOOD PRESSURE: 170 MMHG

## 2017-07-22 DIAGNOSIS — Z48.00 CHANGE OR REMOVAL OF WOUND PACKING: Primary | ICD-10-CM

## 2017-07-22 DIAGNOSIS — K61.1 PERIRECTAL ABSCESS: ICD-10-CM

## 2017-07-22 DIAGNOSIS — IMO0001 ELEVATED BLOOD PRESSURE: ICD-10-CM

## 2017-07-22 PROCEDURE — 99283 EMERGENCY DEPT VISIT LOW MDM: CPT

## 2017-07-22 PROCEDURE — 99282 EMERGENCY DEPT VISIT SF MDM: CPT | Performed by: EMERGENCY MEDICINE

## 2017-07-22 RX ORDER — LIDOCAINE HYDROCHLORIDE AND EPINEPHRINE 20; 5 MG/ML; UG/ML
30 INJECTION, SOLUTION EPIDURAL; INFILTRATION; INTRACAUDAL; PERINEURAL ONCE
Status: DISCONTINUED | OUTPATIENT
Start: 2017-07-22 | End: 2017-07-22 | Stop reason: HOSPADM

## 2017-07-22 NOTE — ED PROVIDER NOTES
"Subjective   History of Present Illness  History of Present Illness    Chief complaint: \"I need my abscess packed \"    Location: perirectal    Quality/Severity:  NA    Timing/Duration: several days    Modifying Factors: NA    Associated Symptoms: no fever    Narrative: 31-year-old male who had a perirectal abscess I&D in the OR several days ago by general surgery return to the emergency department again for wound packing.  He was able tolerate removing the packing yesterday at home by home health, however she was unable to tolerate repacking of the abscess even after the administration of by mouth Dilaudid at home.  He returns to the ED with packing out since last night requesting that it be packed at this time with use of local anesthetic.  No fevers.    Review of Systems  All other systems reviewed and are otherwise negative  Past Medical History:   Diagnosis Date   • Abdominal bloating    • Anxiety    • Depression    • Ear infection     frequent ear infections   • Hand fracture, right    • Lower abdominal pain    • Nausea and vomiting    • Obsessive compulsive disorder        No Known Allergies    Past Surgical History:   Procedure Laterality Date   • INCISION AND DRAINAGE PERIRECTAL ABSCESS Left 7/17/2017    Procedure: INCISION AND DRAINAGE OF PERIRECTAL ABSCESS;  Surgeon: Cameron Hair MD;  Location: [facility] OR;  Service:    • ORIF FINGER FRACTURE Right 2/17/2017    Procedure: PERCUTANOUS FIXATION OF SMALL FINGER METACARPAL FRACTURE RT HAND;  Surgeon: Cameron Mathew MD;  Location:  [facility];  Service:    • SIGMOIDOSCOPY N/A 7/17/2017    Procedure: SIGMOIDOSCOPY RIGID;  Surgeon: Cameron Hair MD;  Location: [facility] OR;  Service:    • TONSILLECTOMY     • TONSILLECTOMY  2009       Family History   Problem Relation Age of Onset   • Alcohol abuse Mother    • Heart disease Mother    • Hypertension Mother    • Mental illness Mother    • Hypertension Father    • Heart disease Father    • Alcohol abuse Father  "   • Crohn's disease Father    • Schizophrenia Father    • Drug abuse Father    • Suicide Attempts Father    • Mental illness Father    • Mental illness Sister    • Bipolar disorder Sister    • OCD Sister        Social History     Social History   • Marital status:      Spouse name: N/A   • Number of children: N/A   • Years of education: N/A     Social History Main Topics   • Smoking status: Former Smoker     Packs/day: 1.00     Years: 15.00     Types: Cigarettes     Quit date: 2012   • Smokeless tobacco: Former User     Types: Chew     Quit date: 1/1/2014   • Alcohol use 0.6 oz/week     1 Cans of beer per week      Comment: Occasional   • Drug use: No   • Sexual activity: Defer     Other Topics Concern   • Not on file     Social History Narrative     ED Triage Vitals   Temp Heart Rate Resp BP SpO2   07/22/17 1042 07/22/17 1042 07/22/17 1042 07/22/17 1042 07/22/17 1042   98.1 °F (36.7 °C) 77 16 162/118 96 %      Temp src Heart Rate Source Patient Position BP Location FiO2 (%)   07/22/17 1042 07/22/17 1042 07/22/17 1042 07/22/17 1042 --   Oral Monitor Sitting Right arm        Objective   Physical Exam   Constitutional: He is oriented to person, place, and time. He appears well-developed. No distress.   Not overtly toxic appearing   Cardiovascular:   Pink, warm and well perfused   Pulmonary/Chest: Effort normal. No respiratory distress.   Genitourinary:         Neurological: He is alert and oriented to person, place, and time.   Skin: Skin is warm and dry.   Psychiatric: He has a normal mood and affect. Thought content normal.   Vitals reviewed.      Procedures         ED Course  ED Course   Comment By Time   7/22/17 7/19/17 7/17/17 6/15/17  /118 147/84 108/72 134/82 Angelo Del Rosario MD 07/22 1103      Wound care:  I anesthetized the area around the incision site left perirectal with lidocaine 2% with epinephrine buffered by sodium bicarbonate with good response.  Abscess was irrigated.  Quarter inch iodoform  packing approximately 15 cm's placed into the wound.  Patient tolerated the procedure well.            MDM    Final diagnoses:   Perirectal abscess   Change or removal of wound packing   Elevated blood pressure              Medication List      Changed          pantoprazole 20 MG EC tablet   Commonly known as:  PROTONIX   Take 1 tablet by mouth Daily.   What changed:  when to take this           Follow-up Information     Schedule an appointment as soon as possible for a visit with Destiny Stock MD.    Specialties:  Internal Medicine, Pediatrics    Why:  Further evaluation of elevated blood pressure    Contact information:    [address]  [address]  [place] [address]  [phone]          Follow up with Cameron Hair MD. Go in 2 days.    Specialty:  General Surgery    Contact information:    [address]  [address]  [place] [address]  [phone]                 Angelo Del Rosario MD  07/22/17 1129

## 2017-07-23 ENCOUNTER — HOSPITAL ENCOUNTER (EMERGENCY)
Facility: HOSPITAL | Age: 32
Discharge: HOME | End: 2017-07-23
Attending: EMERGENCY MEDICINE | Admitting: EMERGENCY MEDICINE

## 2017-07-23 VITALS
SYSTOLIC BLOOD PRESSURE: 155 MMHG | TEMPERATURE: 98.3 F | HEIGHT: 71 IN | OXYGEN SATURATION: 96 % | BODY MASS INDEX: 37.1 KG/M2 | RESPIRATION RATE: 18 BRPM | WEIGHT: 265 LBS | DIASTOLIC BLOOD PRESSURE: 103 MMHG | HEART RATE: 80 BPM

## 2017-07-23 DIAGNOSIS — Z48.00 CHANGE OR REMOVAL OF WOUND PACKING: Primary | ICD-10-CM

## 2017-07-23 DIAGNOSIS — IMO0001 ELEVATED BLOOD PRESSURE: ICD-10-CM

## 2017-07-23 PROCEDURE — 99284 EMERGENCY DEPT VISIT MOD MDM: CPT | Performed by: EMERGENCY MEDICINE

## 2017-07-23 PROCEDURE — 99283 EMERGENCY DEPT VISIT LOW MDM: CPT

## 2017-07-23 RX ORDER — LIDOCAINE HYDROCHLORIDE AND EPINEPHRINE 20; 5 MG/ML; UG/ML
20 INJECTION, SOLUTION EPIDURAL; INFILTRATION; INTRACAUDAL; PERINEURAL ONCE
Status: DISCONTINUED | OUTPATIENT
Start: 2017-07-23 | End: 2017-07-23 | Stop reason: HOSPADM

## 2017-07-23 NOTE — ED PROVIDER NOTES
Subjective   History of Present Illness  History of Present Illness        Narrative: 31-year-old male who is recently postop for perirectal abscess return to the emergency department for abscess packing change as he does not tolerate this procedure at home without local anesthesia.  No fevers.  No exacerbating or relieving factors.    Review of Systems  All systems reviewed and are negative as related chief complaint.  Past Medical History:   Diagnosis Date   • Abdominal bloating    • Anxiety    • Change or removal of wound packing    • Depression    • Ear infection     frequent ear infections   • Hand fracture, right    • Lower abdominal pain    • Nausea and vomiting    • Obsessive compulsive disorder        No Known Allergies    Past Surgical History:   Procedure Laterality Date   • INCISION AND DRAINAGE PERIRECTAL ABSCESS Left 7/17/2017    Procedure: INCISION AND DRAINAGE OF PERIRECTAL ABSCESS;  Surgeon: Cameron Hair MD;  Location: [facility] OR;  Service:    • ORIF FINGER FRACTURE Right 2/17/2017    Procedure: PERCUTANOUS FIXATION OF SMALL FINGER METACARPAL FRACTURE RT HAND;  Surgeon: Cameron Mathew MD;  Location:  [facility];  Service:    • SIGMOIDOSCOPY N/A 7/17/2017    Procedure: SIGMOIDOSCOPY RIGID;  Surgeon: Cameron Hair MD;  Location: [facility] OR;  Service:    • TONSILLECTOMY     • TONSILLECTOMY  2009       Family History   Problem Relation Age of Onset   • Alcohol abuse Mother    • Heart disease Mother    • Hypertension Mother    • Mental illness Mother    • Hypertension Father    • Heart disease Father    • Alcohol abuse Father    • Crohn's disease Father    • Schizophrenia Father    • Drug abuse Father    • Suicide Attempts Father    • Mental illness Father    • Mental illness Sister    • Bipolar disorder Sister    • OCD Sister        Social History     Social History   • Marital status:      Spouse name: N/A   • Number of children: N/A   • Years of education: N/A     Social History Main  Topics   • Smoking status: Former Smoker     Packs/day: 1.00     Years: 15.00     Types: Cigarettes     Quit date: 2012   • Smokeless tobacco: Former User     Types: Chew     Quit date: 1/1/2014   • Alcohol use 0.6 oz/week     1 Cans of beer per week      Comment: Occasional   • Drug use: No   • Sexual activity: Defer     Other Topics Concern   • None     Social History Narrative   • None     ED Triage Vitals   Temp Heart Rate Resp BP SpO2   07/23/17 1206 07/23/17 1206 07/23/17 1206 07/23/17 1206 07/23/17 1206   98.3 °F (36.8 °C) 80 18 155/103 96 %      Temp src Heart Rate Source Patient Position BP Location FiO2 (%)   07/23/17 1206 07/23/17 1206 07/23/17 1206 07/23/17 1206 --   Oral Monitor Lying Right arm            Objective   Physical Exam   Constitutional: He is oriented to person, place, and time. He appears well-developed. No distress.   Nontoxic   Cardiovascular:   Pink, warm and well perfused   Pulmonary/Chest: Effort normal. No respiratory distress.   Genitourinary:         Neurological: He is alert and oriented to person, place, and time.   Skin: Skin is warm and dry.   Psychiatric: He has a normal mood and affect. Thought content normal.       Procedures         ED Course  ED Course          Wound Care: Wound is cleansed topically with Betadine, anesthetized locally with lidocaine 2% with epinephrine that has been neutralized with Neut.  Packing is removed without difficulty.  Moderate amount of purulent discharge comes out with it.  Abscess is irrigated.  Approximately 15 cm of 1/4 inch iodoform packing came to the abscess cavity.  Procedure well tolerated.  No, patient's.        MDM    Final diagnoses:   Change or removal of wound packing   Elevated blood pressure              Medication List      Changed          pantoprazole 20 MG EC tablet   Commonly known as:  PROTONIX   Take 1 tablet by mouth Daily.   What changed:  when to take this           Follow-up Information     Follow up with Cameron MINER  MD Reginaldo. Go in 1 day.    Specialty:  General Surgery    Contact information:    [address]  [address]  [place] [address]  [phone]          Follow up with Destiny Stock MD. Schedule an appointment as soon as possible for a visit in 1 week.    Specialties:  Internal Medicine, Pediatrics    Why:  Reevaluation of elevated blood pressure    Contact information:    [address]  [address]  [place] [address]  [phone]                 Angelo Del Rosario MD  07/23/17 1458

## 2017-07-24 ENCOUNTER — OFFICE VISIT (OUTPATIENT)
Dept: SURGERY | Facility: CLINIC | Age: 32
End: 2017-07-24

## 2017-07-24 DIAGNOSIS — Z09 SURGICAL FOLLOW-UP CARE: Primary | ICD-10-CM

## 2017-07-24 LAB — BACTERIA SPEC ANAEROBE CULT: ABNORMAL

## 2017-07-24 PROCEDURE — 99024 POSTOP FOLLOW-UP VISIT: CPT | Performed by: SURGERY

## 2017-07-24 NOTE — PROGRESS NOTES
1 wk s/p I/D perirectal abscess, pt states he has excruciating pain only when the area is packed, otherwise well  Patient has been unable to tolerate the iodoform packing and as a result has gone to the emergency room on several occasions.  His cultures came back today and he is still draining purulent drainage from the site.  The packing was removed.  We started him on Augmentin 875 mg 1 by mouth twice a day ×1 week.  We will stop the daily packing and have him get in the shower with this twice a day I feel that the seton will hold the wound open for drainage.

## 2017-07-31 ENCOUNTER — OFFICE VISIT (OUTPATIENT)
Dept: SURGERY | Facility: CLINIC | Age: 32
End: 2017-07-31

## 2017-07-31 DIAGNOSIS — Z09 SURGICAL FOLLOW-UP CARE: Primary | ICD-10-CM

## 2017-07-31 PROCEDURE — 99024 POSTOP FOLLOW-UP VISIT: CPT | Performed by: SURGERY

## 2017-08-14 ENCOUNTER — OFFICE VISIT (OUTPATIENT)
Dept: SURGERY | Facility: CLINIC | Age: 32
End: 2017-08-14

## 2017-08-14 DIAGNOSIS — Z09 SURGICAL FOLLOW-UP CARE: Primary | ICD-10-CM

## 2017-08-14 PROCEDURE — 99024 POSTOP FOLLOW-UP VISIT: CPT | Performed by: SURGERY

## 2017-08-14 NOTE — PROGRESS NOTES
4 wks s/p I/D perirectal abscess, c/o pain and yellowish/bloody drainage complains of some pain and still has a moderate amount of purulent drainage.  He denies any fevers or chills.  I started him on Flagyl 500 mg by mouth 3 times a day 21 were dispensed without refills and Motrin 800 mg by mouth 3 times a day 60 were dispensed without refills.  He should continue local wound care and I will see him back in 1 week.

## 2017-08-21 ENCOUNTER — OFFICE VISIT (OUTPATIENT)
Dept: SURGERY | Facility: CLINIC | Age: 32
End: 2017-08-21

## 2017-08-21 DIAGNOSIS — Z09 SURGICAL FOLLOW-UP CARE: Primary | ICD-10-CM

## 2017-08-21 PROCEDURE — 99024 POSTOP FOLLOW-UP VISIT: CPT | Performed by: SURGERY

## 2017-08-21 RX ORDER — METRONIDAZOLE 500 MG/1
TABLET ORAL
COMMUNITY
Start: 2017-08-14 | End: 2017-09-07

## 2017-08-21 NOTE — PROGRESS NOTES
5 WKS S/P I/D PERIRECTAL ABSCESS, ABX FINISH TOMORROW, AREA IS STILL DRAINING AND PAINFUL  His drainage has improved.  There is less erythema today.  I wrote him a prescription for an additional 500 mg of Flagyl 3 times a day ×1 week.  I will see him back in 2 weeks.

## 2017-09-05 ENCOUNTER — OFFICE VISIT (OUTPATIENT)
Dept: SURGERY | Facility: CLINIC | Age: 32
End: 2017-09-05

## 2017-09-05 VITALS
OXYGEN SATURATION: 97 % | SYSTOLIC BLOOD PRESSURE: 156 MMHG | HEIGHT: 71 IN | HEART RATE: 100 BPM | TEMPERATURE: 98 F | DIASTOLIC BLOOD PRESSURE: 98 MMHG

## 2017-09-05 DIAGNOSIS — K60.3 ANAL FISTULA: Primary | ICD-10-CM

## 2017-09-05 PROCEDURE — 99024 POSTOP FOLLOW-UP VISIT: CPT | Performed by: SURGERY

## 2017-09-05 NOTE — H&P
PATIENT INFORMATION  Luis Miguel Carrera       - 1985    CHIEF COMPLAINT  Chief Complaint   Patient presents with   • Post-op Follow-up     7 wk 1 day s/p I&D perirectal abscess       HISTORY OF PRESENT ILLNESS  HPI he has improved.  There is now minimal drainage.  He denies any fevers or chills.  He has completed his Flagyl.  He is status post incision and drainage and placement of a seton for an ischiorectal abscess and fistula and a no.        REVIEW OF SYSTEMS  Review of Systems      ACTIVE PROBLEMS  Patient Active Problem List    Diagnosis   • Perirectal abscess [K61.1]   • Shortness of breath [R06.02]   • Generalized abdominal pain [R10.84]   • Bipolar affective disorder, current episode mixed [F31.60]   • OCD (obsessive compulsive disorder) [F42.9]   • Excessive anger [R45.4]   • Gastroesophageal reflux disease without esophagitis [K21.9]   • Allergic rhinitis [J30.9]   • Recurrent acute suppurative otitis media without spontaneous rupture of left tympanic membrane [H66.005]   • Otitis externa of left ear [H60.92]   • Anxiety [F41.9]   • Major depressive disorder [F32.9]         PAST MEDICAL HISTORY  Past Medical History:   Diagnosis Date   • Abdominal bloating    • Anxiety    • Change or removal of wound packing    • Depression    • Ear infection     frequent ear infections   • Hand fracture, right    • Lower abdominal pain    • Nausea and vomiting    • Obsessive compulsive disorder          SURGICAL HISTORY  Past Surgical History:   Procedure Laterality Date   • INCISION AND DRAINAGE PERIRECTAL ABSCESS Left 2017    Procedure: INCISION AND DRAINAGE OF PERIRECTAL ABSCESS;  Surgeon: Cameron Hair MD;  Location:  [facility] OR;  Service:    • ORIF FINGER FRACTURE Right 2017    Procedure: PERCUTANOUS FIXATION OF SMALL FINGER METACARPAL FRACTURE RT HAND;  Surgeon: Cameron Mathew MD;  Location:  [facility];  Service:    • SIGMOIDOSCOPY N/A 2017    Procedure: SIGMOIDOSCOPY RIGID;  Surgeon:  "Cameron Hair MD;  Location: [facility] OR;  Service:    • TONSILLECTOMY     • TONSILLECTOMY  2009         FAMILY HISTORY  Family History   Problem Relation Age of Onset   • Alcohol abuse Mother    • Heart disease Mother    • Hypertension Mother    • Mental illness Mother    • Hypertension Father    • Heart disease Father    • Alcohol abuse Father    • Crohn's disease Father    • Schizophrenia Father    • Drug abuse Father    • Suicide Attempts Father    • Mental illness Father    • Mental illness Sister    • Bipolar disorder Sister    • OCD Sister          SOCIAL HISTORY  Social History     Occupational History   • Not on file.     Social History Main Topics   • Smoking status: Former Smoker     Packs/day: 1.00     Years: 15.00     Types: Cigarettes     Quit date: 2012   • Smokeless tobacco: Former User     Types: Chew     Quit date: 1/1/2014   • Alcohol use 0.6 oz/week     1 Cans of beer per week      Comment: Occasional   • Drug use: No   • Sexual activity: Defer         CURRENT MEDICATIONS    Current Outpatient Prescriptions:   •  acetaminophen (TYLENOL) 500 MG tablet, Take 1,000 mg by mouth Every 6 (Six) Hours As Needed for Mild Pain (1-3)., Disp: , Rfl:   •  lidocaine (XYLOCAINE) 5 % ointment, Apply  topically 3 (Three) Times a Day., Disp: 50 g, Rfl: 0  •  metroNIDAZOLE (FLAGYL) 500 MG tablet, , Disp: , Rfl:   •  oxymetazoline (AFRIN) 0.05 % nasal spray, 2 sprays into each nostril Every 8 (Eight) Hours As Needed for Congestion., Disp: , Rfl:   •  pantoprazole (PROTONIX) 20 MG EC tablet, Take 1 tablet by mouth Daily. (Patient taking differently: Take 20 mg by mouth Every Morning.), Disp: 30 tablet, Rfl: 3  •  raNITIdine (ZANTAC) 75 MG tablet, Take 75 mg by mouth 2 (Two) Times a Day., Disp: , Rfl:     ALLERGIES  Review of patient's allergies indicates no known allergies.    VITALS  Vitals:    09/05/17 0949   BP: 156/98   Pulse: 100   Temp: 98 °F (36.7 °C)   TempSrc: Oral   SpO2: 97%   Height: 71\" (180.3 cm) "       LAST RESULTS   Admission on 07/17/2017, Discharged on 07/18/2017   Component Date Value Ref Range Status   • Glucose 07/17/2017 113* 65 - 99 mg/dL Final   • BUN 07/17/2017 15  6 - 20 mg/dL Final   • Creatinine 07/17/2017 0.99  0.76 - 1.27 mg/dL Final   • Sodium 07/17/2017 143  136 - 145 mmol/L Final   • Potassium 07/17/2017 4.3  3.5 - 5.2 mmol/L Final   • Chloride 07/17/2017 106  98 - 107 mmol/L Final   • CO2 07/17/2017 25.0  22.0 - 29.0 mmol/L Final   • Calcium 07/17/2017 9.1  8.6 - 10.5 mg/dL Final   • eGFR Non African Amer 07/17/2017 89  >60 mL/min/1.73 Final   • BUN/Creatinine Ratio 07/17/2017 15.2  7.0 - 25.0 Final   • Anion Gap 07/17/2017 12.0  mmol/L Final   • WBC 07/17/2017 13.34* 4.80 - 10.80 10*3/mm3 Final   • RBC 07/17/2017 5.05  4.70 - 6.10 10*6/mm3 Final   • Hemoglobin 07/17/2017 14.5  14.0 - 18.0 g/dL Final   • Hematocrit 07/17/2017 41.6* 42.0 - 52.0 % Final   • MCV 07/17/2017 82.4  80.0 - 94.0 fL Final   • MCH 07/17/2017 28.7  27.0 - 31.0 pg Final   • MCHC 07/17/2017 34.9  31.0 - 37.0 g/dL Final   • RDW 07/17/2017 11.4* 11.5 - 14.5 % Final   • RDW-SD 07/17/2017 33.8* 37.0 - 54.0 fl Final   • MPV 07/17/2017 8.5  7.4 - 10.4 fL Final   • Platelets 07/17/2017 301  140 - 500 10*3/mm3 Final   • Neutrophil % 07/17/2017 68.6  45.0 - 70.0 % Final   • Lymphocyte % 07/17/2017 17.5* 20.0 - 45.0 % Final   • Monocyte % 07/17/2017 11.2* 3.0 - 8.0 % Final   • Eosinophil % 07/17/2017 2.3  0.0 - 4.0 % Final   • Basophil % 07/17/2017 0.3  0.0 - 2.0 % Final   • Immature Grans % 07/17/2017 0.1  0.0 - 0.5 % Final   • Neutrophils, Absolute 07/17/2017 9.13* 1.50 - 8.30 10*3/mm3 Final   • Lymphocytes, Absolute 07/17/2017 2.34  0.60 - 4.80 10*3/mm3 Final   • Monocytes, Absolute 07/17/2017 1.50* 0.00 - 1.00 10*3/mm3 Final   • Eosinophils, Absolute 07/17/2017 0.31* 0.10 - 0.30 10*3/mm3 Final   • Basophils, Absolute 07/17/2017 0.04  0.00 - 0.20 10*3/mm3 Final   • Immature Grans, Absolute 07/17/2017 0.02  0.00 - 0.03  10*3/mm3 Final   • nRBC 07/17/2017 0.0  0.0 - 0.0 /100 WBC Final   • Culture 07/17/2017 Prevotella melaninogenica*  Final    Beta lactamase positive confers resistance to ampcillin, amoxicillin, penicillin, ticarcillin, and piperacillin but NOT amoxicillin-clavulanate, ampicillin-sulbactam, or piperacillin-tazobactam.   • Wound Culture 07/17/2017 Scant growth (1+) Streptococcus anginosus*  Final   • Gram Stain Result 07/17/2017 Rare (1+) WBCs seen   Final   • Gram Stain Result 07/17/2017 Rare (1+) Gram negative bacilli   Final     No results found.    PHYSICAL EXAM  Physical Exam 9 overweight white male in no active distress.  He is oriented ×3.  His gait is normal.  His heart shows a regular rate and rhythm.  His lungs are clear and equal.  His seton is in place and there is now minimal drainage.  The I&D site has largely closed.    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  We will take him back to the operating room to determine where the fistula is in relationship to his sphincter muscle.  If it is superficial we will perform a fistulotomy if it is encompassing we will proceed with a fistula anal plug.  This is been arranged for September 13.  No Follow-up on file.

## 2017-09-05 NOTE — PROGRESS NOTES
PATIENT INFORMATION  Luis Miguel Carrera       - 1985    CHIEF COMPLAINT  Chief Complaint   Patient presents with   • Post-op Follow-up     7 wk 1 day s/p I&D perirectal abscess       HISTORY OF PRESENT ILLNESS  HPI he has improved.  There is now minimal drainage.  He denies any fevers or chills.  He has completed his Flagyl.  He is status post incision and drainage and placement of a seton for an ischiorectal abscess and fistula and a no.        REVIEW OF SYSTEMS  Review of Systems      ACTIVE PROBLEMS  Patient Active Problem List    Diagnosis   • Perirectal abscess [K61.1]   • Shortness of breath [R06.02]   • Generalized abdominal pain [R10.84]   • Bipolar affective disorder, current episode mixed [F31.60]   • OCD (obsessive compulsive disorder) [F42.9]   • Excessive anger [R45.4]   • Gastroesophageal reflux disease without esophagitis [K21.9]   • Allergic rhinitis [J30.9]   • Recurrent acute suppurative otitis media without spontaneous rupture of left tympanic membrane [H66.005]   • Otitis externa of left ear [H60.92]   • Anxiety [F41.9]   • Major depressive disorder [F32.9]         PAST MEDICAL HISTORY  Past Medical History:   Diagnosis Date   • Abdominal bloating    • Anxiety    • Change or removal of wound packing    • Depression    • Ear infection     frequent ear infections   • Hand fracture, right    • Lower abdominal pain    • Nausea and vomiting    • Obsessive compulsive disorder          SURGICAL HISTORY  Past Surgical History:   Procedure Laterality Date   • INCISION AND DRAINAGE PERIRECTAL ABSCESS Left 2017    Procedure: INCISION AND DRAINAGE OF PERIRECTAL ABSCESS;  Surgeon: Cameron Hair MD;  Location:  [facility];  Service:    • ORIF FINGER FRACTURE Right 2017    Procedure: PERCUTANOUS FIXATION OF SMALL FINGER METACARPAL FRACTURE RT HAND;  Surgeon: Cameron Mathew MD;  Location:  [facility];  Service:    • SIGMOIDOSCOPY N/A 2017    Procedure: SIGMOIDOSCOPY RIGID;  Surgeon:  "Cameron Hair MD;  Location: [facility] OR;  Service:    • TONSILLECTOMY     • TONSILLECTOMY  2009         FAMILY HISTORY  Family History   Problem Relation Age of Onset   • Alcohol abuse Mother    • Heart disease Mother    • Hypertension Mother    • Mental illness Mother    • Hypertension Father    • Heart disease Father    • Alcohol abuse Father    • Crohn's disease Father    • Schizophrenia Father    • Drug abuse Father    • Suicide Attempts Father    • Mental illness Father    • Mental illness Sister    • Bipolar disorder Sister    • OCD Sister          SOCIAL HISTORY  Social History     Occupational History   • Not on file.     Social History Main Topics   • Smoking status: Former Smoker     Packs/day: 1.00     Years: 15.00     Types: Cigarettes     Quit date: 2012   • Smokeless tobacco: Former User     Types: Chew     Quit date: 1/1/2014   • Alcohol use 0.6 oz/week     1 Cans of beer per week      Comment: Occasional   • Drug use: No   • Sexual activity: Defer         CURRENT MEDICATIONS    Current Outpatient Prescriptions:   •  acetaminophen (TYLENOL) 500 MG tablet, Take 1,000 mg by mouth Every 6 (Six) Hours As Needed for Mild Pain (1-3)., Disp: , Rfl:   •  lidocaine (XYLOCAINE) 5 % ointment, Apply  topically 3 (Three) Times a Day., Disp: 50 g, Rfl: 0  •  metroNIDAZOLE (FLAGYL) 500 MG tablet, , Disp: , Rfl:   •  oxymetazoline (AFRIN) 0.05 % nasal spray, 2 sprays into each nostril Every 8 (Eight) Hours As Needed for Congestion., Disp: , Rfl:   •  pantoprazole (PROTONIX) 20 MG EC tablet, Take 1 tablet by mouth Daily. (Patient taking differently: Take 20 mg by mouth Every Morning.), Disp: 30 tablet, Rfl: 3  •  raNITIdine (ZANTAC) 75 MG tablet, Take 75 mg by mouth 2 (Two) Times a Day., Disp: , Rfl:     ALLERGIES  Review of patient's allergies indicates no known allergies.    VITALS  Vitals:    09/05/17 0949   BP: 156/98   Pulse: 100   Temp: 98 °F (36.7 °C)   TempSrc: Oral   SpO2: 97%   Height: 71\" (180.3 cm) "       LAST RESULTS   Admission on 07/17/2017, Discharged on 07/18/2017   Component Date Value Ref Range Status   • Glucose 07/17/2017 113* 65 - 99 mg/dL Final   • BUN 07/17/2017 15  6 - 20 mg/dL Final   • Creatinine 07/17/2017 0.99  0.76 - 1.27 mg/dL Final   • Sodium 07/17/2017 143  136 - 145 mmol/L Final   • Potassium 07/17/2017 4.3  3.5 - 5.2 mmol/L Final   • Chloride 07/17/2017 106  98 - 107 mmol/L Final   • CO2 07/17/2017 25.0  22.0 - 29.0 mmol/L Final   • Calcium 07/17/2017 9.1  8.6 - 10.5 mg/dL Final   • eGFR Non African Amer 07/17/2017 89  >60 mL/min/1.73 Final   • BUN/Creatinine Ratio 07/17/2017 15.2  7.0 - 25.0 Final   • Anion Gap 07/17/2017 12.0  mmol/L Final   • WBC 07/17/2017 13.34* 4.80 - 10.80 10*3/mm3 Final   • RBC 07/17/2017 5.05  4.70 - 6.10 10*6/mm3 Final   • Hemoglobin 07/17/2017 14.5  14.0 - 18.0 g/dL Final   • Hematocrit 07/17/2017 41.6* 42.0 - 52.0 % Final   • MCV 07/17/2017 82.4  80.0 - 94.0 fL Final   • MCH 07/17/2017 28.7  27.0 - 31.0 pg Final   • MCHC 07/17/2017 34.9  31.0 - 37.0 g/dL Final   • RDW 07/17/2017 11.4* 11.5 - 14.5 % Final   • RDW-SD 07/17/2017 33.8* 37.0 - 54.0 fl Final   • MPV 07/17/2017 8.5  7.4 - 10.4 fL Final   • Platelets 07/17/2017 301  140 - 500 10*3/mm3 Final   • Neutrophil % 07/17/2017 68.6  45.0 - 70.0 % Final   • Lymphocyte % 07/17/2017 17.5* 20.0 - 45.0 % Final   • Monocyte % 07/17/2017 11.2* 3.0 - 8.0 % Final   • Eosinophil % 07/17/2017 2.3  0.0 - 4.0 % Final   • Basophil % 07/17/2017 0.3  0.0 - 2.0 % Final   • Immature Grans % 07/17/2017 0.1  0.0 - 0.5 % Final   • Neutrophils, Absolute 07/17/2017 9.13* 1.50 - 8.30 10*3/mm3 Final   • Lymphocytes, Absolute 07/17/2017 2.34  0.60 - 4.80 10*3/mm3 Final   • Monocytes, Absolute 07/17/2017 1.50* 0.00 - 1.00 10*3/mm3 Final   • Eosinophils, Absolute 07/17/2017 0.31* 0.10 - 0.30 10*3/mm3 Final   • Basophils, Absolute 07/17/2017 0.04  0.00 - 0.20 10*3/mm3 Final   • Immature Grans, Absolute 07/17/2017 0.02  0.00 - 0.03  10*3/mm3 Final   • nRBC 07/17/2017 0.0  0.0 - 0.0 /100 WBC Final   • Culture 07/17/2017 Prevotella melaninogenica*  Final    Beta lactamase positive confers resistance to ampcillin, amoxicillin, penicillin, ticarcillin, and piperacillin but NOT amoxicillin-clavulanate, ampicillin-sulbactam, or piperacillin-tazobactam.   • Wound Culture 07/17/2017 Scant growth (1+) Streptococcus anginosus*  Final   • Gram Stain Result 07/17/2017 Rare (1+) WBCs seen   Final   • Gram Stain Result 07/17/2017 Rare (1+) Gram negative bacilli   Final     No results found.    PHYSICAL EXAM  Physical Exam 9 overweight white male in no active distress.  He is oriented ×3.  His gait is normal.  His heart shows a regular rate and rhythm.  His lungs are clear and equal.  His seton is in place and there is now minimal drainage.  The I&D site has largely closed.    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  We will take him back to the operating room to determine where the fistula is in relationship to his sphincter muscle.  If it is superficial we will perform a fistulotomy if it is encompassing we will proceed with a fistula anal plug.  This is been arranged for September 13.  No Follow-up on file.

## 2017-09-07 ENCOUNTER — APPOINTMENT (OUTPATIENT)
Dept: PREADMISSION TESTING | Facility: HOSPITAL | Age: 32
End: 2017-09-07

## 2017-09-07 VITALS
RESPIRATION RATE: 16 BRPM | BODY MASS INDEX: 37.95 KG/M2 | HEIGHT: 71 IN | DIASTOLIC BLOOD PRESSURE: 104 MMHG | SYSTOLIC BLOOD PRESSURE: 132 MMHG | OXYGEN SATURATION: 99 % | HEART RATE: 90 BPM | WEIGHT: 271.1 LBS

## 2017-09-07 DIAGNOSIS — K60.3 ANAL FISTULA: ICD-10-CM

## 2017-09-07 LAB
DEPRECATED RDW RBC AUTO: 36.4 FL (ref 37–54)
ERYTHROCYTE [DISTWIDTH] IN BLOOD BY AUTOMATED COUNT: 12 % (ref 11.5–14.5)
HCT VFR BLD AUTO: 44.3 % (ref 42–52)
HGB BLD-MCNC: 15.3 G/DL (ref 14–18)
MCH RBC QN AUTO: 29.1 PG (ref 27–31)
MCHC RBC AUTO-ENTMCNC: 34.5 G/DL (ref 31–37)
MCV RBC AUTO: 84.2 FL (ref 80–94)
PLATELET # BLD AUTO: 335 10*3/MM3 (ref 140–500)
PMV BLD AUTO: 8.2 FL (ref 7.4–10.4)
RBC # BLD AUTO: 5.26 10*6/MM3 (ref 4.7–6.1)
WBC NRBC COR # BLD: 8.67 10*3/MM3 (ref 4.8–10.8)

## 2017-09-07 PROCEDURE — 36415 COLL VENOUS BLD VENIPUNCTURE: CPT

## 2017-09-07 PROCEDURE — 93005 ELECTROCARDIOGRAM TRACING: CPT

## 2017-09-07 PROCEDURE — 85027 COMPLETE CBC AUTOMATED: CPT | Performed by: SURGERY

## 2017-09-07 PROCEDURE — 93010 ELECTROCARDIOGRAM REPORT: CPT | Performed by: INTERNAL MEDICINE

## 2017-09-07 NOTE — PAT
Pt here for PAT visit.  Pre-op tests completed, chg soap given, and instructions reviewed.  Will need accu-check dos.  Instructed clears/finish gatorade by 4:30 am dos, voiced understanding.

## 2017-09-07 NOTE — DISCHARGE INSTRUCTIONS
PRE-ADMISSION TESTING INSTRUCTIONS FOR ADULTS    Take your zantac the morning of surgery.    No aspirin, advil, aleve, ibuprofen, naproxen, diet pills, decongestants, or herbal/vitamins between today and surgery.  Stop your Afrin today.  General Instructions:    • Do not eat solid food after midnight the night before surgery.  No gum, mints, or hard candy after midnight the night before surgery.  • You may drink clear liquids the day of surgery up until 2 hours before your arrival time.  (until 4:30 am)  • Clear liquids are liquids you can see through. Nothing RED in color.    Plain water    Sports drinks  Sodas     Gelatin (Jell-O)  Fruit juices without pulp such as white grape juice and apple juice  Popsicles that contain no fruit or yogurt  Tea or coffee (no cream or milk added)    • It is beneficial for you to have a clear drink that contains carbohydrates just before you leave your house and before your fasting time begins.  We suggest a 20 ounce bottle of Gatorade or Powerade for non-diabetic patients or a 20 ounce bottle of G2 or Powerade Zero for diabetic patients.     • Patients who avoid smoking, chewing tobacco and alcohol for 4 weeks prior to surgery have a reduced risk of post-operative complications.  • Do not smoke, use chewing tobacco or drink alcohol the day of surgery    • Bring your C-PAP/ BI-PAP machine if you use one.  • Wear clean comfortable clothes and socks.  • Do not wear contact lenses, lotion, deodorant, or make-up.  Bring a case for your glasses if applicable.   • Bring crutches or walker if applicable.  • Leave all other valuables and jewelry at home.      Preventing a Surgical Site Infection:    • Shower the night before and on the morning of surgery using the chlorhexidine soap you were given.  Use a clean washcloth with the soap.  Place clean sheets on your bed after showering the night before surgery. Do not use the CHG soap on your hair, face, or private areas. Wash your body  gently for five (5) minutes. Do not scrub your skin too hard.  Dry with a clean towel and dress in clean clothing.    • Do not shave the surgical area for 10 days-2 weeks prior to surgery  because the razor can irritate skin and make it easier to develop an infection.    • Make sure you, your family, and all healthcare providers clean their hands with soap and water or an alcohol based hand  before caring for you or your wound.    • If at all possible, quit smoking as many days before surgery as you can.    Day of surgery:    Your surgeon’s office will advise you of your arrival time for the day of surgery.    Upon arrival, a Pre-op nurse and Anesthesia provider will review your health history, obtain vital signs, and answer questions you may have.  The only belongings needed at this time will be your home medications and if applicable your C-PAP/BI-PAP machine.  If you are staying overnight your family can leave the rest of your belongings in the car and bring them to your room later.  A Pre-op nurse will start an IV and you may receive medication in preparation for surgery, including something to help you relax.  Your family will be able to see you in the Pre-op area.  While you are in surgery your family should notify the waiting room  if they leave the waiting room area and provide a contact phone number.    IF you have any questions, you can call the Pre-Admission Department at [phone] or your surgeon's office.    Please be aware that surgery does come with discomfort.  We want to make every effort to control your discomfort so please discuss any uncontrolled symptoms with your nurse.   Your doctor will most likely have prescribed pain medications.      If you are going home after surgery, you will receive individualized written care instructions before being discharged.  A responsible adult (over the age of 18) must drive you to and from the hospital on the day of your surgery and  stay with you for 24 hours after anesthesia.    If you are staying overnight following surgery, you will be transported to your hospital room following the recovery period.  [facility] has all private rooms.    Deductibles and co-payments are collected on the day of service. Please be prepared to pay the required co-pay, deductible or deposit on the day of service as defined by your plan.

## 2017-09-12 ENCOUNTER — ANESTHESIA EVENT (OUTPATIENT)
Dept: PERIOP | Facility: HOSPITAL | Age: 32
End: 2017-09-12

## 2017-09-13 ENCOUNTER — ANESTHESIA (OUTPATIENT)
Dept: PERIOP | Facility: HOSPITAL | Age: 32
End: 2017-09-13

## 2017-09-13 ENCOUNTER — HOSPITAL ENCOUNTER (OUTPATIENT)
Facility: HOSPITAL | Age: 32
Setting detail: HOSPITAL OUTPATIENT SURGERY
Discharge: HOME | End: 2017-09-13
Attending: SURGERY | Admitting: SURGERY

## 2017-09-13 VITALS
OXYGEN SATURATION: 95 % | RESPIRATION RATE: 15 BRPM | TEMPERATURE: 98 F | DIASTOLIC BLOOD PRESSURE: 66 MMHG | HEART RATE: 87 BPM | SYSTOLIC BLOOD PRESSURE: 130 MMHG | BODY MASS INDEX: 37.52 KG/M2 | WEIGHT: 269 LBS

## 2017-09-13 DIAGNOSIS — K60.3 ANAL FISTULA: ICD-10-CM

## 2017-09-13 PROCEDURE — 25010000002 DEXAMETHASONE PER 1 MG: Performed by: NURSE ANESTHETIST, CERTIFIED REGISTERED

## 2017-09-13 PROCEDURE — 46270 REMOVE ANAL FIST SUBQ: CPT | Performed by: SURGERY

## 2017-09-13 PROCEDURE — 25010000002 ONDANSETRON PER 1 MG: Performed by: NURSE ANESTHETIST, CERTIFIED REGISTERED

## 2017-09-13 PROCEDURE — 25010000003 CEFAZOLIN PER 500 MG: Performed by: SURGERY

## 2017-09-13 PROCEDURE — 25010000002 FENTANYL CITRATE (PF) 100 MCG/2ML SOLUTION: Performed by: NURSE ANESTHETIST, CERTIFIED REGISTERED

## 2017-09-13 PROCEDURE — 25010000002 MIDAZOLAM PER 1 MG: Performed by: NURSE ANESTHETIST, CERTIFIED REGISTERED

## 2017-09-13 PROCEDURE — 25010000002 SUCCINYLCHOLINE PER 20 MG: Performed by: NURSE ANESTHETIST, CERTIFIED REGISTERED

## 2017-09-13 PROCEDURE — 25010000002 PROPOFOL 10 MG/ML EMULSION: Performed by: NURSE ANESTHETIST, CERTIFIED REGISTERED

## 2017-09-13 RX ORDER — SODIUM CHLORIDE, SODIUM LACTATE, POTASSIUM CHLORIDE, CALCIUM CHLORIDE 600; 310; 30; 20 MG/100ML; MG/100ML; MG/100ML; MG/100ML
9 INJECTION, SOLUTION INTRAVENOUS CONTINUOUS
Status: DISCONTINUED | OUTPATIENT
Start: 2017-09-13 | End: 2017-09-13 | Stop reason: HOSPADM

## 2017-09-13 RX ORDER — LIDOCAINE HYDROCHLORIDE 20 MG/ML
INJECTION, SOLUTION INFILTRATION; PERINEURAL AS NEEDED
Status: DISCONTINUED | OUTPATIENT
Start: 2017-09-13 | End: 2017-09-13 | Stop reason: SURG

## 2017-09-13 RX ORDER — MIDAZOLAM HYDROCHLORIDE 1 MG/ML
1 INJECTION INTRAMUSCULAR; INTRAVENOUS
Status: DISCONTINUED | OUTPATIENT
Start: 2017-09-13 | End: 2017-09-13 | Stop reason: HOSPADM

## 2017-09-13 RX ORDER — DEXAMETHASONE SODIUM PHOSPHATE 4 MG/ML
8 INJECTION, SOLUTION INTRA-ARTICULAR; INTRALESIONAL; INTRAMUSCULAR; INTRAVENOUS; SOFT TISSUE ONCE AS NEEDED
Status: COMPLETED | OUTPATIENT
Start: 2017-09-13 | End: 2017-09-13

## 2017-09-13 RX ORDER — MEPERIDINE HYDROCHLORIDE 25 MG/ML
12.5 INJECTION INTRAMUSCULAR; INTRAVENOUS; SUBCUTANEOUS
Status: DISCONTINUED | OUTPATIENT
Start: 2017-09-13 | End: 2017-09-13 | Stop reason: HOSPADM

## 2017-09-13 RX ORDER — MIDAZOLAM HYDROCHLORIDE 1 MG/ML
2 INJECTION INTRAMUSCULAR; INTRAVENOUS
Status: DISCONTINUED | OUTPATIENT
Start: 2017-09-13 | End: 2017-09-13 | Stop reason: HOSPADM

## 2017-09-13 RX ORDER — FAMOTIDINE 10 MG/ML
20 INJECTION, SOLUTION INTRAVENOUS
Status: DISCONTINUED | OUTPATIENT
Start: 2017-09-13 | End: 2017-09-13 | Stop reason: HOSPADM

## 2017-09-13 RX ORDER — ACETAMINOPHEN 500 MG
1000 TABLET ORAL ONCE
Status: DISCONTINUED | OUTPATIENT
Start: 2017-09-13 | End: 2017-09-13 | Stop reason: HOSPADM

## 2017-09-13 RX ORDER — ONDANSETRON 2 MG/ML
4 INJECTION INTRAMUSCULAR; INTRAVENOUS ONCE AS NEEDED
Status: COMPLETED | OUTPATIENT
Start: 2017-09-13 | End: 2017-09-13

## 2017-09-13 RX ORDER — ROCURONIUM BROMIDE 10 MG/ML
INJECTION, SOLUTION INTRAVENOUS AS NEEDED
Status: DISCONTINUED | OUTPATIENT
Start: 2017-09-13 | End: 2017-09-13 | Stop reason: SURG

## 2017-09-13 RX ORDER — LIDOCAINE HYDROCHLORIDE 10 MG/ML
0.5 INJECTION, SOLUTION EPIDURAL; INFILTRATION; INTRACAUDAL; PERINEURAL ONCE AS NEEDED
Status: COMPLETED | OUTPATIENT
Start: 2017-09-13 | End: 2017-09-13

## 2017-09-13 RX ORDER — SODIUM CHLORIDE, SODIUM LACTATE, POTASSIUM CHLORIDE, CALCIUM CHLORIDE 600; 310; 30; 20 MG/100ML; MG/100ML; MG/100ML; MG/100ML
50 INJECTION, SOLUTION INTRAVENOUS CONTINUOUS
Status: DISCONTINUED | OUTPATIENT
Start: 2017-09-13 | End: 2017-09-13 | Stop reason: HOSPADM

## 2017-09-13 RX ORDER — FENTANYL CITRATE 50 UG/ML
INJECTION, SOLUTION INTRAMUSCULAR; INTRAVENOUS AS NEEDED
Status: DISCONTINUED | OUTPATIENT
Start: 2017-09-13 | End: 2017-09-13 | Stop reason: SURG

## 2017-09-13 RX ORDER — SODIUM CHLORIDE 0.9 % (FLUSH) 0.9 %
1-10 SYRINGE (ML) INJECTION AS NEEDED
Status: DISCONTINUED | OUTPATIENT
Start: 2017-09-13 | End: 2017-09-13 | Stop reason: HOSPADM

## 2017-09-13 RX ORDER — HYDROCODONE BITARTRATE AND IBUPROFEN 7.5; 2 MG/1; MG/1
1 TABLET, FILM COATED ORAL ONCE AS NEEDED
Status: DISCONTINUED | OUTPATIENT
Start: 2017-09-13 | End: 2017-09-13 | Stop reason: HOSPADM

## 2017-09-13 RX ORDER — SUCCINYLCHOLINE CHLORIDE 20 MG/ML
INJECTION INTRAMUSCULAR; INTRAVENOUS AS NEEDED
Status: DISCONTINUED | OUTPATIENT
Start: 2017-09-13 | End: 2017-09-13 | Stop reason: SURG

## 2017-09-13 RX ORDER — HYDROMORPHONE HCL 110MG/55ML
1 PATIENT CONTROLLED ANALGESIA SYRINGE INTRAVENOUS
Status: DISCONTINUED | OUTPATIENT
Start: 2017-09-13 | End: 2017-09-13 | Stop reason: HOSPADM

## 2017-09-13 RX ORDER — MAGNESIUM HYDROXIDE 1200 MG/15ML
LIQUID ORAL AS NEEDED
Status: DISCONTINUED | OUTPATIENT
Start: 2017-09-13 | End: 2017-09-13 | Stop reason: HOSPADM

## 2017-09-13 RX ORDER — GLYCOPYRROLATE 0.2 MG/ML
0.2 INJECTION INTRAMUSCULAR; INTRAVENOUS
Status: DISCONTINUED | OUTPATIENT
Start: 2017-09-13 | End: 2017-09-13 | Stop reason: HOSPADM

## 2017-09-13 RX ORDER — DEXMEDETOMIDINE HYDROCHLORIDE 100 UG/ML
INJECTION, SOLUTION INTRAVENOUS AS NEEDED
Status: DISCONTINUED | OUTPATIENT
Start: 2017-09-13 | End: 2017-09-13 | Stop reason: SURG

## 2017-09-13 RX ORDER — PROPOFOL 10 MG/ML
VIAL (ML) INTRAVENOUS AS NEEDED
Status: DISCONTINUED | OUTPATIENT
Start: 2017-09-13 | End: 2017-09-13 | Stop reason: SURG

## 2017-09-13 RX ORDER — ONDANSETRON 2 MG/ML
4 INJECTION INTRAMUSCULAR; INTRAVENOUS ONCE AS NEEDED
Status: DISCONTINUED | OUTPATIENT
Start: 2017-09-13 | End: 2017-09-13 | Stop reason: HOSPADM

## 2017-09-13 RX ADMIN — DEXMEDETOMIDINE HYDROCHLORIDE 20 MCG: 100 INJECTION, SOLUTION, CONCENTRATE INTRAVENOUS at 08:05

## 2017-09-13 RX ADMIN — MIDAZOLAM HYDROCHLORIDE 2 MG: 1 INJECTION, SOLUTION INTRAMUSCULAR; INTRAVENOUS at 08:00

## 2017-09-13 RX ADMIN — LIDOCAINE HYDROCHLORIDE 0.5 ML: 10 INJECTION, SOLUTION EPIDURAL; INFILTRATION; INTRACAUDAL; PERINEURAL at 06:45

## 2017-09-13 RX ADMIN — PROPOFOL 200 MG: 10 INJECTION, EMULSION INTRAVENOUS at 08:10

## 2017-09-13 RX ADMIN — FAMOTIDINE 20 MG: 10 INJECTION, SOLUTION INTRAVENOUS at 07:23

## 2017-09-13 RX ADMIN — SODIUM CHLORIDE, SODIUM LACTATE, POTASSIUM CHLORIDE, AND CALCIUM CHLORIDE 9 ML/HR: 600; 310; 30; 20 INJECTION, SOLUTION INTRAVENOUS at 06:45

## 2017-09-13 RX ADMIN — FENTANYL CITRATE 50 MCG: 50 INJECTION, SOLUTION INTRAMUSCULAR; INTRAVENOUS at 08:09

## 2017-09-13 RX ADMIN — SUCCINYLCHOLINE CHLORIDE 140 MG: 20 INJECTION, SOLUTION INTRAMUSCULAR; INTRAVENOUS at 08:11

## 2017-09-13 RX ADMIN — LIDOCAINE HYDROCHLORIDE 100 MG: 20 INJECTION, SOLUTION INFILTRATION; PERINEURAL at 08:09

## 2017-09-13 RX ADMIN — ROCURONIUM BROMIDE 5 MG: 10 INJECTION INTRAVENOUS at 08:09

## 2017-09-13 RX ADMIN — FENTANYL CITRATE 50 MCG: 50 INJECTION, SOLUTION INTRAMUSCULAR; INTRAVENOUS at 08:18

## 2017-09-13 RX ADMIN — SODIUM CHLORIDE, SODIUM LACTATE, POTASSIUM CHLORIDE, AND CALCIUM CHLORIDE: 600; 310; 30; 20 INJECTION, SOLUTION INTRAVENOUS at 07:04

## 2017-09-13 RX ADMIN — DEXMEDETOMIDINE HYDROCHLORIDE 20 MCG: 100 INJECTION, SOLUTION, CONCENTRATE INTRAVENOUS at 08:08

## 2017-09-13 RX ADMIN — ONDANSETRON 4 MG: 2 INJECTION, SOLUTION INTRAMUSCULAR; INTRAVENOUS at 07:23

## 2017-09-13 RX ADMIN — DEXAMETHASONE SODIUM PHOSPHATE 8 MG: 4 INJECTION, SOLUTION INTRAMUSCULAR; INTRAVENOUS at 07:23

## 2017-09-13 RX ADMIN — METRONIDAZOLE 500 MG: 500 INJECTION, SOLUTION INTRAVENOUS at 06:45

## 2017-09-13 RX ADMIN — GLYCOPYRROLATE 0.2 MG: 0.2 INJECTION INTRAMUSCULAR; INTRAVENOUS at 07:23

## 2017-09-13 RX ADMIN — CEFAZOLIN 3 G: 1 INJECTION, POWDER, FOR SOLUTION INTRAVENOUS at 08:19

## 2017-09-13 RX ADMIN — DEXMEDETOMIDINE HYDROCHLORIDE 20 MCG: 100 INJECTION, SOLUTION, CONCENTRATE INTRAVENOUS at 08:28

## 2017-09-13 NOTE — PLAN OF CARE
Problem: Perioperative Period (Adult)  Goal: Signs and Symptoms of Listed Potential Problems Will be Absent or Manageable (Perioperative Period)  Outcome: Outcome(s) achieved Date Met:  09/13/17 09/13/17 0937   Perioperative Period   Problems Assessed (Perioperative Period) all   Problems Present (Perioperative Period) none

## 2017-09-13 NOTE — ANESTHESIA PREPROCEDURE EVALUATION
Anesthesia Evaluation     Patient summary reviewed and Nursing notes reviewed   history of anesthetic complications: PONV  NPO Solid Status: > 8 hours  NPO Liquid Status: > 8 hours     Airway   Mallampati: II  TM distance: >3 FB  Neck ROM: full  no difficulty expected  Dental - normal exam     Pulmonary - normal exam    breath sounds clear to auscultation  (+) a smoker (4 pk yr hx, quit 5 yrs ago) Former,   Cardiovascular - negative cardio ROS and normal exam  Exercise tolerance: excellent (>7 METS)    Rhythm: regular  Rate: normal        Neuro/Psych  (+) psychiatric history (  OCD (obsessive compulsive disorder)  , anger issues) Depression, Anxiety and Bipolar,    GI/Hepatic/Renal/Endo    (+) obesity,  GERD well controlled,     Musculoskeletal (-) negative ROS    Abdominal   (+) obese,    Substance History   (+) alcohol use,      OB/GYN          Other - negative ROS                                       Anesthesia Plan    ASA 2     general     intravenous induction   Anesthetic plan and risks discussed with patient.  Use of blood products discussed with patient  Consented to blood products.

## 2017-09-13 NOTE — PLAN OF CARE
Problem: Patient Care Overview (Adult)  Goal: Adult Individualization and Mutuality  Outcome: Outcome(s) achieved Date Met:  09/13/17 09/13/17 0902   Individualization   Patient Specific Preferences goes by mark

## 2017-09-13 NOTE — PLAN OF CARE
Problem: Perioperative Period (Adult)  Goal: Signs and Symptoms of Listed Potential Problems Will be Absent or Manageable (Perioperative Period)  Outcome: Ongoing (interventions implemented as appropriate)    09/13/17 0904   Perioperative Period   Problems Assessed (Perioperative Period) pain   Problems Present (Perioperative Period) pain;physiologic stress response

## 2017-09-13 NOTE — PLAN OF CARE
Problem: Patient Care Overview (Adult)  Goal: Adult Individualization and Mutuality  Outcome: Ongoing (interventions implemented as appropriate)    09/13/17 0902   Individualization   Patient Specific Preferences goes by mark   Patient Specific Goals go home today   Patient Specific Interventions pain control   Mutuality/Individual Preferences   What Anxieties, Fears or Concerns Do You Have About Your Health or Care? afraid its going to hurt   What Questions Do You Have About Your Health or Care? what did he do?   What Information Would Help Us Give You More Personalized Care? i cant think of anything

## 2017-09-13 NOTE — PLAN OF CARE
Problem: Perioperative Period (Adult)  Goal: Signs and Symptoms of Listed Potential Problems Will be Absent or Manageable (Perioperative Period)  Outcome: Ongoing (interventions implemented as appropriate)    09/13/17 0637   Perioperative Period   Problems Assessed (Perioperative Period) all   Problems Present (Perioperative Period) pain

## 2017-09-13 NOTE — OP NOTE
Preoperative diagnosis fistula in ano  Postoperative diagnosis is same  Procedure examination under anesthesia fistulotomy  Complications none  Anesthesia Gen. via endotracheal tube  Surgeon Dr. Hair  Specimen none  Estimated blood loss 5 cc  Findings chronic fistula tract moderate induration superficial to the sphincter mechanism  Procedure after satisfactory induction of general anesthesia via endotracheal tube the patient was placed in a prone jackknife on the operating table.  His perianal area was prepped and draped sterile fashion.  His buttocks had previously been taped apart.  Attention was directed to the seton at the 9 o'clock position.  His anus was dilated to a large Hill-Mendez retractor.  This seton had partially torn through from his prior surgery and now appeared to be superficial to the sphincter mechanism.  The seton was removed.  Flexible probe was placed in the tract and easily went into the internal opening. skin and subcutaneous tissue and chronic granulation tissue was cauterized overlying the probe.  Hemostasis was assured.  This appeared to be superficial to the sphincter mechanism.  The site was locally infiltrated with 10 cc of exparel.  Site was sterilely dressed and he was transferred to the recovery room in stable condition.  He is awake alert stable and has voided he'll be discharged home to follow-up in my office next week call for problems.  In the a.m. start 3 times a day sitz baths.  He was given a prescription for Percocet 5/325 one to 2 by mouth every 4 hours when necessary pain 40 these were dispensed without refills.  Colace 100 mg by mouth daily at bedtime 15 were dispensed without refills.  If he has problems voiding he needs to report to the emergency room.

## 2017-09-13 NOTE — PLAN OF CARE
Problem: Patient Care Overview (Adult)  Goal: Plan of Care Review  Outcome: Outcome(s) achieved Date Met:  09/13/17 09/13/17 0902 09/13/17 0937   Coping/Psychosocial Response Interventions   Plan Of Care Reviewed With patient --    Patient Care Overview   Progress improving --    Outcome Evaluation   Outcome Summary/Follow up Plan --  vss, waiting to go home

## 2017-09-13 NOTE — PLAN OF CARE
Problem: Patient Care Overview (Adult)  Goal: Plan of Care Review  Outcome: Ongoing (interventions implemented as appropriate)    09/13/17 0902   Coping/Psychosocial Response Interventions   Plan Of Care Reviewed With patient   Patient Care Overview   Progress improving   Outcome Evaluation   Outcome Summary/Follow up Plan denies severe pain or nausea

## 2017-09-13 NOTE — PLAN OF CARE
Problem: Patient Care Overview (Adult)  Goal: Plan of Care Review  Outcome: Ongoing (interventions implemented as appropriate)    09/13/17 0637   Coping/Psychosocial Response Interventions   Plan Of Care Reviewed With patient   Patient Care Overview   Progress no change   Outcome Evaluation   Outcome Summary/Follow up Plan vss, waiting for procedure

## 2017-09-13 NOTE — ANESTHESIA PROCEDURE NOTES
Airway  Urgency: elective    Date/Time: 9/13/2017 8:13 AM  Airway not difficult    General Information and Staff    Patient location during procedure: OR    Indications and Patient Condition  Indications for airway management: airway protection    Preoxygenated: yes  MILS maintained throughout  Mask difficulty assessment: 1 - vent by mask    Final Airway Details  Final airway type: endotracheal airway      Successful airway: ETT  Cuffed: yes   Successful intubation technique: direct laryngoscopy  Facilitating devices/methods: intubating stylet  Endotracheal tube insertion site: oral  Blade: Ewing  Blade size: #3  ETT size: 8.0 mm  Cormack-Lehane Classification: grade IIa - partial view of glottis  Placement verified by: chest auscultation and capnometry   Measured from: lips  ETT to lips (cm): 22

## 2017-09-13 NOTE — H&P (VIEW-ONLY)
PATIENT INFORMATION  Luis Miguel Carrera       - 1985    CHIEF COMPLAINT  Chief Complaint   Patient presents with   • Post-op Follow-up     7 wk 1 day s/p I&D perirectal abscess       HISTORY OF PRESENT ILLNESS  HPI he has improved.  There is now minimal drainage.  He denies any fevers or chills.  He has completed his Flagyl.  He is status post incision and drainage and placement of a seton for an ischiorectal abscess and fistula and a no.        REVIEW OF SYSTEMS  Review of Systems      ACTIVE PROBLEMS  Patient Active Problem List    Diagnosis   • Perirectal abscess [K61.1]   • Shortness of breath [R06.02]   • Generalized abdominal pain [R10.84]   • Bipolar affective disorder, current episode mixed [F31.60]   • OCD (obsessive compulsive disorder) [F42.9]   • Excessive anger [R45.4]   • Gastroesophageal reflux disease without esophagitis [K21.9]   • Allergic rhinitis [J30.9]   • Recurrent acute suppurative otitis media without spontaneous rupture of left tympanic membrane [H66.005]   • Otitis externa of left ear [H60.92]   • Anxiety [F41.9]   • Major depressive disorder [F32.9]         PAST MEDICAL HISTORY  Past Medical History:   Diagnosis Date   • Abdominal bloating    • Anxiety    • Change or removal of wound packing    • Depression    • Ear infection     frequent ear infections   • Hand fracture, right    • Lower abdominal pain    • Nausea and vomiting    • Obsessive compulsive disorder          SURGICAL HISTORY  Past Surgical History:   Procedure Laterality Date   • INCISION AND DRAINAGE PERIRECTAL ABSCESS Left 2017    Procedure: INCISION AND DRAINAGE OF PERIRECTAL ABSCESS;  Surgeon: Cameron Hair MD;  Location:  [facility] OR;  Service:    • ORIF FINGER FRACTURE Right 2017    Procedure: PERCUTANOUS FIXATION OF SMALL FINGER METACARPAL FRACTURE RT HAND;  Surgeon: Cameron Mathew MD;  Location:  [facility];  Service:    • SIGMOIDOSCOPY N/A 2017    Procedure: SIGMOIDOSCOPY RIGID;  Surgeon:  "Cameron Hair MD;  [facility]: [facility];  Service:    • TONSILLECTOMY     • TONSILLECTOMY  2009         FAMILY HISTORY  Family History   Problem Relation Age of Onset   • Alcohol abuse Mother    • Heart disease Mother    • Hypertension Mother    • Mental illness Mother    • Hypertension Father    • Heart disease Father    • Alcohol abuse Father    • Crohn's disease Father    • Schizophrenia Father    • Drug abuse Father    • Suicide Attempts Father    • Mental illness Father    • Mental illness Sister    • Bipolar disorder Sister    • OCD Sister          SOCIAL HISTORY  Social History     Occupational History   • Not on file.     Social History Main Topics   • Smoking status: Former Smoker     Packs/day: 1.00     Years: 15.00     Types: Cigarettes     Quit date: 2012   • Smokeless tobacco: Former User     Types: Chew     Quit date: 1/1/2014   • Alcohol use 0.6 oz/week     1 Cans of beer per week      Comment: Occasional   • Drug use: No   • Sexual activity: Defer         CURRENT MEDICATIONS    Current Outpatient Prescriptions:   •  acetaminophen (TYLENOL) 500 MG tablet, Take 1,000 mg by mouth Every 6 (Six) Hours As Needed for Mild Pain (1-3)., Disp: , Rfl:   •  lidocaine (XYLOCAINE) 5 % ointment, Apply  topically 3 (Three) Times a Day., Disp: 50 g, Rfl: 0  •  metroNIDAZOLE (FLAGYL) 500 MG tablet, , Disp: , Rfl:   •  oxymetazoline (AFRIN) 0.05 % nasal spray, 2 sprays into each nostril Every 8 (Eight) Hours As Needed for Congestion., Disp: , Rfl:   •  pantoprazole (PROTONIX) 20 MG EC tablet, Take 1 tablet by mouth Daily. (Patient taking differently: Take 20 mg by mouth Every Morning.), Disp: 30 tablet, Rfl: 3  •  raNITIdine (ZANTAC) 75 MG tablet, Take 75 mg by mouth 2 (Two) Times a Day., Disp: , Rfl:     ALLERGIES  Review of patient's allergies indicates no known allergies.    VITALS  Vitals:    09/05/17 0949   BP: 156/98   Pulse: 100   Temp: 98 °F (36.7 °C)   TempSrc: Oral   SpO2: 97%   Height: 71\" (180.3 cm) "       LAST RESULTS   Admission on 07/17/2017, Discharged on 07/18/2017   Component Date Value Ref Range Status   • Glucose 07/17/2017 113* 65 - 99 mg/dL Final   • BUN 07/17/2017 15  6 - 20 mg/dL Final   • Creatinine 07/17/2017 0.99  0.76 - 1.27 mg/dL Final   • Sodium 07/17/2017 143  136 - 145 mmol/L Final   • Potassium 07/17/2017 4.3  3.5 - 5.2 mmol/L Final   • Chloride 07/17/2017 106  98 - 107 mmol/L Final   • CO2 07/17/2017 25.0  22.0 - 29.0 mmol/L Final   • Calcium 07/17/2017 9.1  8.6 - 10.5 mg/dL Final   • eGFR Non African Amer 07/17/2017 89  >60 mL/min/1.73 Final   • BUN/Creatinine Ratio 07/17/2017 15.2  7.0 - 25.0 Final   • Anion Gap 07/17/2017 12.0  mmol/L Final   • WBC 07/17/2017 13.34* 4.80 - 10.80 10*3/mm3 Final   • RBC 07/17/2017 5.05  4.70 - 6.10 10*6/mm3 Final   • Hemoglobin 07/17/2017 14.5  14.0 - 18.0 g/dL Final   • Hematocrit 07/17/2017 41.6* 42.0 - 52.0 % Final   • MCV 07/17/2017 82.4  80.0 - 94.0 fL Final   • MCH 07/17/2017 28.7  27.0 - 31.0 pg Final   • MCHC 07/17/2017 34.9  31.0 - 37.0 g/dL Final   • RDW 07/17/2017 11.4* 11.5 - 14.5 % Final   • RDW-SD 07/17/2017 33.8* 37.0 - 54.0 fl Final   • MPV 07/17/2017 8.5  7.4 - 10.4 fL Final   • Platelets 07/17/2017 301  140 - 500 10*3/mm3 Final   • Neutrophil % 07/17/2017 68.6  45.0 - 70.0 % Final   • Lymphocyte % 07/17/2017 17.5* 20.0 - 45.0 % Final   • Monocyte % 07/17/2017 11.2* 3.0 - 8.0 % Final   • Eosinophil % 07/17/2017 2.3  0.0 - 4.0 % Final   • Basophil % 07/17/2017 0.3  0.0 - 2.0 % Final   • Immature Grans % 07/17/2017 0.1  0.0 - 0.5 % Final   • Neutrophils, Absolute 07/17/2017 9.13* 1.50 - 8.30 10*3/mm3 Final   • Lymphocytes, Absolute 07/17/2017 2.34  0.60 - 4.80 10*3/mm3 Final   • Monocytes, Absolute 07/17/2017 1.50* 0.00 - 1.00 10*3/mm3 Final   • Eosinophils, Absolute 07/17/2017 0.31* 0.10 - 0.30 10*3/mm3 Final   • Basophils, Absolute 07/17/2017 0.04  0.00 - 0.20 10*3/mm3 Final   • Immature Grans, Absolute 07/17/2017 0.02  0.00 - 0.03  10*3/mm3 Final   • nRBC 07/17/2017 0.0  0.0 - 0.0 /100 WBC Final   • Culture 07/17/2017 Prevotella melaninogenica*  Final    Beta lactamase positive confers resistance to ampcillin, amoxicillin, penicillin, ticarcillin, and piperacillin but NOT amoxicillin-clavulanate, ampicillin-sulbactam, or piperacillin-tazobactam.   • Wound Culture 07/17/2017 Scant growth (1+) Streptococcus anginosus*  Final   • Gram Stain Result 07/17/2017 Rare (1+) WBCs seen   Final   • Gram Stain Result 07/17/2017 Rare (1+) Gram negative bacilli   Final     No results found.    PHYSICAL EXAM  Physical Exam 9 overweight white male in no active distress.  He is oriented ×3.  His gait is normal.  His heart shows a regular rate and rhythm.  His lungs are clear and equal.  His seton is in place and there is now minimal drainage.  The I&D site has largely closed.    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  We will take him back to the operating room to determine where the fistula is in relationship to his sphincter muscle.  If it is superficial we will perform a fistulotomy if it is encompassing we will proceed with a fistula anal plug.  This is been arranged for September 13.  No Follow-up on file.

## 2017-09-20 ENCOUNTER — OFFICE VISIT (OUTPATIENT)
Dept: SURGERY | Facility: CLINIC | Age: 32
End: 2017-09-20

## 2017-09-20 DIAGNOSIS — Z09 SURGICAL FOLLOW-UP CARE: Primary | ICD-10-CM

## 2017-09-20 PROCEDURE — 99024 POSTOP FOLLOW-UP VISIT: CPT | Performed by: SURGERY

## 2017-09-20 RX ORDER — OXYCODONE HYDROCHLORIDE AND ACETAMINOPHEN 5; 325 MG/1; MG/1
TABLET ORAL
COMMUNITY
Start: 2017-09-13 | End: 2017-10-11

## 2017-09-20 NOTE — PROGRESS NOTES
1 wk s/p fistulotomy, pt is w/o complaints  He is without complaints.  He is content.  He denies any fevers or chills.  The fistulotomy site is healing well.  Continue the sitz baths and I will see him back in 3 weeks

## 2017-10-11 ENCOUNTER — OFFICE VISIT (OUTPATIENT)
Dept: SURGERY | Facility: CLINIC | Age: 32
End: 2017-10-11

## 2017-10-11 DIAGNOSIS — Z09 SURGICAL FOLLOW-UP CARE: Primary | ICD-10-CM

## 2017-10-11 PROCEDURE — 99024 POSTOP FOLLOW-UP VISIT: CPT | Performed by: SURGERY

## 2017-10-11 NOTE — PROGRESS NOTES
4 wks s/p fistulotomy, pt is w/o complaints  Any problems.  He has minimal drainage.  The site has nearly healed is very superficial and should heal over the next 2 weeks.  There is no cellulitis.  Continue sitz baths for 2 weeks then do them when necessary.  I will see him when necessary.

## 2017-10-16 ENCOUNTER — OFFICE VISIT (OUTPATIENT)
Dept: INTERNAL MEDICINE | Facility: CLINIC | Age: 32
End: 2017-10-16

## 2017-10-16 VITALS
DIASTOLIC BLOOD PRESSURE: 82 MMHG | OXYGEN SATURATION: 98 % | SYSTOLIC BLOOD PRESSURE: 140 MMHG | HEIGHT: 71 IN | HEART RATE: 110 BPM | WEIGHT: 269 LBS | BODY MASS INDEX: 37.66 KG/M2 | TEMPERATURE: 98.5 F

## 2017-10-16 DIAGNOSIS — R03.0 TRANSIENT ELEVATED BLOOD PRESSURE: ICD-10-CM

## 2017-10-16 DIAGNOSIS — H66.3X2 CHRONIC SUPPURATIVE OTITIS MEDIA OF LEFT EAR, UNSPECIFIED OTITIS MEDIA LOCATION: Primary | ICD-10-CM

## 2017-10-16 DIAGNOSIS — H91.92 HEARING LOSS OF LEFT EAR, UNSPECIFIED HEARING LOSS TYPE: ICD-10-CM

## 2017-10-16 PROCEDURE — 99214 OFFICE O/P EST MOD 30 MIN: CPT | Performed by: INTERNAL MEDICINE

## 2017-10-16 RX ORDER — AMOXICILLIN AND CLAVULANATE POTASSIUM 875; 125 MG/1; MG/1
1 TABLET, FILM COATED ORAL 2 TIMES DAILY
Qty: 20 TABLET | Refills: 0 | Status: SHIPPED | OUTPATIENT
Start: 2017-10-16 | End: 2017-10-26

## 2017-10-16 NOTE — PROGRESS NOTES
"Subjective     Luis Miguel Carrera is a 32 y.o. male, who presents with a chief complaint of   Chief Complaint   Patient presents with   • Ear Drainage     bilateral, 1 x week        HPI Comments: 31 yo M here with left ear pain that started about 2 weeks ago. He started having pain as well as drainage and \"feeling of fullness\". He used ofloxacin that has helped for a few days. He has also had hearing loss on the left. No fever or chills. He has a history of chronic ear infections and states that he he has not been able to see ENT due to insurance not covering. He has been referred two times in the past, but has not followed through with appointment.        The following portions of the patient's history were reviewed and updated as appropriate: allergies, current medications, past family history, past medical history, past social history, past surgical history and problem list.    Allergies: Review of patient's allergies indicates no known allergies.    Current Outpatient Prescriptions:   •  acetaminophen (TYLENOL) 500 MG tablet, Take 1,000 mg by mouth Every 6 (Six) Hours As Needed for Mild Pain (1-3)., Disp: , Rfl:   •  oxymetazoline (AFRIN) 0.05 % nasal spray, 2 sprays into each nostril Every 8 (Eight) Hours As Needed for Congestion., Disp: , Rfl:   •  raNITIdine (ZANTAC) 75 MG tablet, Take 75 mg by mouth Daily., Disp: , Rfl:   •  amoxicillin-clavulanate (AUGMENTIN) 875-125 MG per tablet, Take 1 tablet by mouth 2 (Two) Times a Day for 10 days., Disp: 20 tablet, Rfl: 0  •  neomycin-polymyxin-hydrocortisone (CORTISPORIN) 3.5-25121-5 otic solution, Administer 3 drops into the left ear 4 (Four) Times a Day., Disp: 10 mL, Rfl: 0  There are no discontinued medications.    Review of Systems   Constitutional: Negative for chills, fatigue and fever.   HENT: Positive for ear discharge, ear pain and hearing loss. Negative for congestion.    Respiratory: Negative for cough and shortness of breath.        Objective     /82 " "(BP Location: Left arm, Patient Position: Sitting, Cuff Size: Adult)  Pulse 110  Temp 98.5 °F (36.9 °C) (Oral)   Ht 71\" (180.3 cm)  Wt 269 lb (122 kg)  SpO2 98%  BMI 37.52 kg/m2      Physical Exam   Constitutional: He is oriented to person, place, and time. He appears well-developed and well-nourished. No distress.   HENT:   Head: Normocephalic and atraumatic.   Right Ear: Hearing, tympanic membrane and external ear normal. No middle ear effusion.   Left Ear: External ear normal. There is drainage. Tympanic membrane is erythematous, retracted and bulging. A middle ear effusion is present. Decreased hearing is noted.   Mouth/Throat: Oropharynx is clear and moist. No oropharyngeal exudate.   Eyes: Conjunctivae are normal. Right eye exhibits no discharge. Left eye exhibits no discharge. No scleral icterus.   Neck: Neck supple.   Cardiovascular: Normal rate, regular rhythm and normal heart sounds.  Exam reveals no gallop and no friction rub.    No murmur heard.  Pulmonary/Chest: Effort normal and breath sounds normal. No respiratory distress. He has no wheezes. He has no rales.   Lymphadenopathy:     He has no cervical adenopathy.   Neurological: He is alert and oriented to person, place, and time.   Skin: Skin is warm. No rash noted.   Psychiatric: He has a normal mood and affect. His behavior is normal.   Nursing note and vitals reviewed.        Results for orders placed or performed in visit on 09/07/17   CBC (No Diff)   Result Value Ref Range    WBC 8.67 4.80 - 10.80 10*3/mm3    RBC 5.26 4.70 - 6.10 10*6/mm3    Hemoglobin 15.3 14.0 - 18.0 g/dL    Hematocrit 44.3 42.0 - 52.0 %    MCV 84.2 80.0 - 94.0 fL    MCH 29.1 27.0 - 31.0 pg    MCHC 34.5 31.0 - 37.0 g/dL    RDW 12.0 11.5 - 14.5 %    RDW-SD 36.4 (L) 37.0 - 54.0 fl    MPV 8.2 7.4 - 10.4 fL    Platelets 335 140 - 500 10*3/mm3       Assessment/Plan   Luis Miguel was seen today for ear drainage.    Diagnoses and all orders for this visit:    Chronic suppurative " otitis media of left ear, unspecified otitis media location  -     Ambulatory Referral to ENT (Otolaryngology)    Hearing loss of left ear, unspecified hearing loss type    Transient elevated blood pressure    Other orders  -     amoxicillin-clavulanate (AUGMENTIN) 875-125 MG per tablet; Take 1 tablet by mouth 2 (Two) Times a Day for 10 days.  -     neomycin-polymyxin-hydrocortisone (CORTISPORIN) 3.5-61239-8 otic solution; Administer 3 drops into the left ear 4 (Four) Times a Day.      Will start antibiotics and drops for ear infection   Call if not better  Will see ENT this week. Our office to re-schedule   Patient as been referred twice and has not gone   Discussed today with him that if he does not go for follow up he may have long term hearing loss     Elevated BP likely related to pain and will have patient monitor at local pharmacy. He needs follow up with me regarding this if not better after treatment for his ear infection.     Return if symptoms worsen or fail to improve.    Destiny Stock MD  10/16/2017

## 2018-12-25 ENCOUNTER — HOSPITAL ENCOUNTER (EMERGENCY)
Facility: HOSPITAL | Age: 33
Discharge: HOME | End: 2018-12-25
Attending: EMERGENCY MEDICINE | Admitting: EMERGENCY MEDICINE

## 2018-12-25 VITALS
RESPIRATION RATE: 18 BRPM | DIASTOLIC BLOOD PRESSURE: 98 MMHG | OXYGEN SATURATION: 97 % | HEIGHT: 71 IN | HEART RATE: 86 BPM | BODY MASS INDEX: 37.1 KG/M2 | SYSTOLIC BLOOD PRESSURE: 145 MMHG | TEMPERATURE: 97.4 F | WEIGHT: 265 LBS

## 2018-12-25 DIAGNOSIS — J02.0 STREP PHARYNGITIS: Primary | ICD-10-CM

## 2018-12-25 LAB
FLUAV AG NPH QL: NEGATIVE
FLUBV AG NPH QL IA: NEGATIVE
S PYO AG THROAT QL: POSITIVE

## 2018-12-25 PROCEDURE — 99282 EMERGENCY DEPT VISIT SF MDM: CPT | Performed by: EMERGENCY MEDICINE

## 2018-12-25 PROCEDURE — 87880 STREP A ASSAY W/OPTIC: CPT | Performed by: EMERGENCY MEDICINE

## 2018-12-25 PROCEDURE — 87804 INFLUENZA ASSAY W/OPTIC: CPT | Performed by: EMERGENCY MEDICINE

## 2018-12-25 PROCEDURE — 99283 EMERGENCY DEPT VISIT LOW MDM: CPT

## 2018-12-25 RX ORDER — AMOXICILLIN 500 MG/1
500 CAPSULE ORAL 3 TIMES DAILY
Qty: 30 CAPSULE | Refills: 0 | Status: SHIPPED | OUTPATIENT
Start: 2018-12-25 | End: 2019-01-04

## 2018-12-25 RX ORDER — AMOXICILLIN 250 MG/1
500 CAPSULE ORAL ONCE
Status: COMPLETED | OUTPATIENT
Start: 2018-12-25 | End: 2018-12-25

## 2018-12-25 RX ORDER — IBUPROFEN 600 MG/1
600 TABLET ORAL EVERY 6 HOURS PRN
COMMUNITY
End: 2021-10-14

## 2018-12-25 RX ORDER — DIVALPROEX SODIUM 500 MG/1
750 TABLET, EXTENDED RELEASE ORAL 2 TIMES DAILY
COMMUNITY

## 2018-12-25 RX ORDER — SERTRALINE HYDROCHLORIDE 100 MG/1
100 TABLET, FILM COATED ORAL EVERY MORNING
COMMUNITY

## 2018-12-25 RX ADMIN — AMOXICILLIN 500 MG: 250 CAPSULE ORAL at 10:47

## 2019-03-11 ENCOUNTER — TELEPHONE (OUTPATIENT)
Dept: ORTHOPEDIC SURGERY | Facility: CLINIC | Age: 34
End: 2019-03-11

## 2019-03-11 NOTE — TELEPHONE ENCOUNTER
Called patient to make an appointment, phone service said that caller was unavailable to take the call.

## 2020-01-30 ENCOUNTER — OFFICE VISIT (OUTPATIENT)
Dept: SLEEP MEDICINE | Facility: HOSPITAL | Age: 35
End: 2020-01-30

## 2020-01-30 VITALS
SYSTOLIC BLOOD PRESSURE: 127 MMHG | DIASTOLIC BLOOD PRESSURE: 77 MMHG | WEIGHT: 283 LBS | HEART RATE: 73 BPM | BODY MASS INDEX: 39.62 KG/M2 | HEIGHT: 71 IN

## 2020-01-30 DIAGNOSIS — E66.9 OBESITY (BMI 30-39.9): ICD-10-CM

## 2020-01-30 DIAGNOSIS — G47.8 NON-RESTORATIVE SLEEP: ICD-10-CM

## 2020-01-30 DIAGNOSIS — G47.10 HYPERSOMNIA: ICD-10-CM

## 2020-01-30 DIAGNOSIS — G47.30 SLEEP APNEA, UNSPECIFIED TYPE: Primary | ICD-10-CM

## 2020-01-30 PROCEDURE — G0463 HOSPITAL OUTPT CLINIC VISIT: HCPCS

## 2020-01-30 PROCEDURE — 99213 OFFICE O/P EST LOW 20 MIN: CPT | Performed by: FAMILY MEDICINE

## 2020-02-06 ENCOUNTER — HOSPITAL ENCOUNTER (OUTPATIENT)
Dept: SLEEP MEDICINE | Facility: HOSPITAL | Age: 35
Discharge: HOME | End: 2020-02-06
Admitting: FAMILY MEDICINE

## 2020-02-06 DIAGNOSIS — E66.9 OBESITY (BMI 30-39.9): ICD-10-CM

## 2020-02-06 DIAGNOSIS — G47.8 NON-RESTORATIVE SLEEP: ICD-10-CM

## 2020-02-06 DIAGNOSIS — G47.30 SLEEP APNEA, UNSPECIFIED TYPE: ICD-10-CM

## 2020-02-06 DIAGNOSIS — G47.10 HYPERSOMNIA: ICD-10-CM

## 2020-02-06 PROCEDURE — 95806 SLEEP STUDY UNATT&RESP EFFT: CPT

## 2020-02-06 PROCEDURE — 95806 SLEEP STUDY UNATT&RESP EFFT: CPT | Performed by: FAMILY MEDICINE

## 2020-02-13 ENCOUNTER — TELEPHONE (OUTPATIENT)
Dept: SLEEP MEDICINE | Facility: HOSPITAL | Age: 35
End: 2020-02-13

## 2020-09-20 ENCOUNTER — APPOINTMENT (OUTPATIENT)
Dept: GENERAL RADIOLOGY | Facility: HOSPITAL | Age: 35
End: 2020-09-20

## 2020-09-20 ENCOUNTER — HOSPITAL ENCOUNTER (EMERGENCY)
Facility: HOSPITAL | Age: 35
Discharge: HOME | End: 2020-09-20
Attending: EMERGENCY MEDICINE | Admitting: EMERGENCY MEDICINE

## 2020-09-20 VITALS
WEIGHT: 275 LBS | HEIGHT: 71 IN | TEMPERATURE: 98.6 F | BODY MASS INDEX: 38.5 KG/M2 | OXYGEN SATURATION: 99 % | RESPIRATION RATE: 20 BRPM | HEART RATE: 99 BPM

## 2020-09-20 DIAGNOSIS — Z71.1 FEARED CONDITION NOT DEMONSTRATED: Primary | ICD-10-CM

## 2020-09-20 PROCEDURE — 99282 EMERGENCY DEPT VISIT SF MDM: CPT

## 2020-09-20 PROCEDURE — 99282 EMERGENCY DEPT VISIT SF MDM: CPT | Performed by: EMERGENCY MEDICINE

## 2020-09-20 PROCEDURE — 72170 X-RAY EXAM OF PELVIS: CPT

## 2020-09-21 NOTE — ED PROVIDER NOTES
Subjective   35-year-old male presents with concern for needle broken in his buttocks.  Patient states that he gave himself a steroid injection and when he took the needle out it was not there.  States he looked down in the container inpsych oil but no needle.  States that needle made a popping sound after he injected.  Patient denies pain at site but states he did have bleeding so he knows he did stick the needle in.  Otherwise feels well.          Review of Systems   Constitutional: Negative.    Musculoskeletal: Negative.    Skin: Negative.        Past Medical History:   Diagnosis Date   • Abdominal bloating    • Anxiety    • Bipolar 1 disorder (CMS/HCC)    • Change or removal of wound packing    • Depression    • Ear infection     frequent ear infections   • Hand fracture, right    • Hypertension    • Lower abdominal pain    • Nausea and vomiting    • Obsessive compulsive disorder    • PONV (postoperative nausea and vomiting)    • Rectal fistula     sched surgery       No Known Allergies    Past Surgical History:   Procedure Laterality Date   • HERNIA REPAIR     • INCISION AND DRAINAGE PERIRECTAL ABSCESS Left 7/17/2017    Procedure: INCISION AND DRAINAGE OF PERIRECTAL ABSCESS;  Surgeon: Cameron Hair MD;  Location: [facility] OR;  Service:    • INCISION AND DRAINAGE PERIRECTAL ABSCESS     • ORIF FINGER FRACTURE Right 2/17/2017    Procedure: PERCUTANOUS FIXATION OF SMALL FINGER METACARPAL FRACTURE RT HAND;  Surgeon: Cameron Mathew MD;  Location: [facility] OR [facility];  Service:    • RECTAL FISTULOTOMY N/A 9/13/2017    Procedure: Exam under anesthesia, RECTAL FISTULOTOMY;  Surgeon: Cameron Hair MD;  Location: [facility] OR;  Service:    • SIGMOIDOSCOPY N/A 7/17/2017    Procedure: SIGMOIDOSCOPY RIGID;  Surgeon: Cameron Hair MD;  Location: [facility] OR;  Service:    • TONSILLECTOMY     • TONSILLECTOMY  2009       Family History   Problem Relation Age of Onset   • Alcohol abuse Mother    • Heart disease Mother    •  Hypertension Mother    • Mental illness Mother    • Hypertension Father    • Heart disease Father    • Alcohol abuse Father    • Crohn's disease Father    • Schizophrenia Father    • Drug abuse Father    • Suicide Attempts Father    • Mental illness Father    • Mental illness Sister    • Bipolar disorder Sister    • OCD Sister        Social History     Socioeconomic History   • Marital status:      Spouse name: Not on file   • Number of children: Not on file   • Years of education: Not on file   • Highest education level: Not on file   Tobacco Use   • Smoking status: Former Smoker     Packs/day: 1.00     Years: 15.00     Pack years: 15.00     Types: Cigarettes     Quit date:      Years since quittin.7   • Smokeless tobacco: Former User     Types: Chew     Quit date: 2014   Substance and Sexual Activity   • Alcohol use: Yes     Alcohol/week: 15.0 standard drinks     Types: 1 Cans of beer, 14 Standard drinks or equivalent per week     Comment: Drinks 1-2 drinkls daily ([place], whiskey, beer)   • Drug use: No   • Sexual activity: Defer           Objective   Physical Exam  Constitutional:       Appearance: Normal appearance.   Pulmonary:      Effort: Pulmonary effort is normal. No respiratory distress.   Musculoskeletal: Normal range of motion.   Neurological:      Mental Status: He is alert and oriented to person, place, and time.   Psychiatric:         Mood and Affect: Mood normal.         Behavior: Behavior normal.         Thought Content: Thought content normal.         Judgment: Judgment normal.         Procedures           ED Course  ED Course as of Sep 20 2026   Sun Sep 20, 2020   2026 X-ray negative for foreign body.  In discussion with patient, sounds as if he used a retractable needle and did not realize this.    [TD]      ED Course User Index  [TD] Cameron Huff MD                                           [facility]    Final diagnoses:   Feared condition not demonstrated            Daria  Cameron GONZALEZ MD  09/20/20 2026

## 2021-06-11 ENCOUNTER — HOSPITAL ENCOUNTER (OUTPATIENT)
Dept: GENERAL RADIOLOGY | Facility: HOSPITAL | Age: 36
Discharge: HOME | End: 2021-06-11
Admitting: FAMILY MEDICINE

## 2021-06-11 ENCOUNTER — TRANSCRIBE ORDERS (OUTPATIENT)
Dept: ADMINISTRATIVE | Facility: HOSPITAL | Age: 36
End: 2021-06-11

## 2021-06-11 DIAGNOSIS — M25.511 RIGHT SHOULDER PAIN, UNSPECIFIED CHRONICITY: Primary | ICD-10-CM

## 2021-06-11 DIAGNOSIS — M25.511 RIGHT SHOULDER PAIN, UNSPECIFIED CHRONICITY: ICD-10-CM

## 2021-06-11 PROCEDURE — 73030 X-RAY EXAM OF SHOULDER: CPT

## 2021-07-21 ENCOUNTER — OFFICE VISIT (OUTPATIENT)
Dept: ORTHOPEDIC SURGERY | Facility: CLINIC | Age: 36
End: 2021-07-21

## 2021-07-21 VITALS
SYSTOLIC BLOOD PRESSURE: 146 MMHG | BODY MASS INDEX: 38.5 KG/M2 | HEART RATE: 88 BPM | DIASTOLIC BLOOD PRESSURE: 95 MMHG | HEIGHT: 71 IN | WEIGHT: 275 LBS

## 2021-07-21 DIAGNOSIS — M25.311 SHOULDER INSTABILITY, RIGHT: Primary | ICD-10-CM

## 2021-07-21 DIAGNOSIS — S43.431A TEAR OF RIGHT GLENOID LABRUM, INITIAL ENCOUNTER: ICD-10-CM

## 2021-07-21 PROCEDURE — 99204 OFFICE O/P NEW MOD 45 MIN: CPT | Performed by: ORTHOPAEDIC SURGERY

## 2021-07-21 RX ORDER — LISINOPRIL 20 MG/1
20 TABLET ORAL EVERY MORNING
COMMUNITY

## 2021-07-21 RX ORDER — OMEPRAZOLE 20 MG/1
20 CAPSULE, DELAYED RELEASE ORAL DAILY PRN
COMMUNITY

## 2021-07-21 RX ORDER — DEXTROAMPHETAMINE SACCHARATE, AMPHETAMINE ASPARTATE, DEXTROAMPHETAMINE SULFATE AND AMPHETAMINE SULFATE 5; 5; 5; 5 MG/1; MG/1; MG/1; MG/1
40 TABLET ORAL EVERY MORNING
COMMUNITY

## 2021-07-21 NOTE — PROGRESS NOTES
Subjective:     Patient ID: Luis Miguel Carrera is a 35 y.o. male.    Chief Complaint:  Right shoulder pain, new patient  History of Present Illness  Luis Miguel Carrera presents to clinic today for evaluation of right shoulder pain. He is right-hand dominant. He reports 1 month ago he fell from a short ladder, turned, and landed on concrete. He believes the shoulder dislocated. He was not placed in a sling. The patient confirms 2 years ago he did dislocate the right shoulder and performed self-reduction. His current pain is rated 8/10 in severity and described as aching, stabbing, sharp, shooting, and throbbing in nature. His pain is primarily localized around the shoulder anteriorly and laterally with some radiation down the arm to the hand. He also reports having pain to the posterior neck, the chest, and the clavicle. His pain is exacerbated by lifting, pushing, pulling, and overhead activity during work with decreasing function after a work day. Aleve provides very minimal relief. He reports bilateral hand numbness and tingling that is exacerbated by driving. The patient reports difficulty sleeping.     He believes he may have suffered a left shoulder dislocation in the past. He denies any history of blood clots, cardiac problems, or diabetes.     Social History     Occupational History   • Occupation: maintenance   Tobacco Use   • Smoking status: Former Smoker     Packs/day: 1.00     Years: 15.00     Pack years: 15.00     Types: Cigarettes     Quit date:      Years since quittin.5   • Smokeless tobacco: Former User     Types: Chew     Quit date: 2014   Substance and Sexual Activity   • Alcohol use: Yes     Alcohol/week: 15.0 standard drinks     Types: 1 Cans of beer, 14 Standard drinks or equivalent per week     Comment: Drinks 1-2 drinkls daily ([place], whiskey, beer)   • Drug use: No   • Sexual activity: Defer      Past Medical History:   Diagnosis Date   • Abdominal bloating    • Anxiety    • Bipolar 1  "disorder (CMS/HCC)    • Change or removal of wound packing    • Depression    • Ear infection     frequent ear infections   • Hand fracture, right    • Hypertension    • Lower abdominal pain    • Nausea and vomiting    • Obsessive compulsive disorder    • PONV (postoperative nausea and vomiting)    • Rectal fistula     sched surgery     Past Surgical History:   Procedure Laterality Date   • HERNIA REPAIR     • INCISION AND DRAINAGE PERIRECTAL ABSCESS Left 7/17/2017    Procedure: INCISION AND DRAINAGE OF PERIRECTAL ABSCESS;  Surgeon: Cameron Hair MD;  Location:  [facility] OR;  Service:    • INCISION AND DRAINAGE PERIRECTAL ABSCESS     • ORIF FINGER FRACTURE Right 2/17/2017    Procedure: PERCUTANOUS FIXATION OF SMALL FINGER METACARPAL FRACTURE RT HAND;  Surgeon: Cameron Mathew MD;  Location:  [facility];  Service:    • RECTAL FISTULOTOMY N/A 9/13/2017    Procedure: Exam under anesthesia, RECTAL FISTULOTOMY;  Surgeon: Cameron Hair MD;  Location:  [facility] OR;  Service:    • SIGMOIDOSCOPY N/A 7/17/2017    Procedure: SIGMOIDOSCOPY RIGID;  Surgeon: Cameron Hair MD;  Location: [facility] OR;  Service:    • TONSILLECTOMY     • TONSILLECTOMY  2009       Family History   Problem Relation Age of Onset   • Alcohol abuse Mother    • Heart disease Mother    • Hypertension Mother    • Mental illness Mother    • Hypertension Father    • Heart disease Father    • Alcohol abuse Father    • Crohn's disease Father    • Schizophrenia Father    • Drug abuse Father    • Suicide Attempts Father    • Mental illness Father    • Mental illness Sister    • Bipolar disorder Sister    • OCD Sister    • Diabetes Maternal Uncle    • Hypertension Paternal Grandmother          Review of Systems        Objective:  Vitals:    07/21/21 0926   BP: 146/95   Pulse: 88   Weight: 125 kg (275 lb)   Height: 180.3 cm (70.98\")         07/21/21  0926   Weight: 125 kg (275 lb)     Body mass index is 38.37 kg/m².  Physical Exam    Vital signs reviewed. "   General: No acute distress, alert and oriented  Eyes: conjunctiva clear; pupils equally round and reactive  ENT: external ears and nose atraumatic; oropharynx clear  CV: no peripheral edema  Resp: normal respiratory effort  Skin: no rashes or wounds; normal turgor  Psych: mood and affect appropriate; recent and remote memory intact          Ortho Exam       Right shoulder-  Tenderness  located maximally to the anterior and lateral shoulder into the subacromial space  FF-   Active- 130 degrees  Passive-160  Strength- 4+/5  ER-      Active- 25 degrees  Passive 40  Strength- 4+/5  IR        L5  Strength- 4+/5 on belly press test  Strength- 4+/5 on abduction  Empty can test- Mildly Positive      Neer's sign- Positive  Meredith- Positive    Yergason- Negative  Speeds- Negative  O’Jesús’s- Negative     Apprehension- Positive  Relocation- Positive     Dynamic scapular winging  Sulcus sign- Negative at neutral and 30 degrees of external rotation  2+ anterior laxity  No posterior or inferior laxity noted    Brisk cap refill to all digits, 2+ radial pulse     Positive sensation to proximal lateral arm  Positive sensation to light touch palmar, dorsal aspects of small and index fingers and anatomic snuffbox right hand    Imaging:  Review of outside x-rays right shoulder including review of images as well as radiology report indicate no evidence of fracture, dislocation, or subluxation.    Review of outside MRI right shoulder including review of images as well as radiology report indicates chondral injury with subchondral edema the anterior inferior glenoid as well as soft tissue Bankart lesion with anteroinferior glenoid labral tear, small Hill-Sachs impression deformity posterior superior humeral head.    Assessment:        1. Shoulder instability, right    2. Tear of right glenoid labrum, initial encounter           Plan:          1. Discussed treatment options at length with patient at today's visit.  Given this  instability episode to his shoulder as well as concern for recurrence of instability with his young age and repetitive overhead activities, he would like to proceed with surgical treatment for his labral tear at this time.Plan will be for right shoulder arthroscopy, labral repair, possible capsulorrhaphy, possible remplissage and all associated procedures.  I reviewed with him details of procedure as well as risks, benefits, and alternatives of the procedure with risks including but not limited to neurovascular damage, bleeding, infection, numbness to upper extremity, recurrent instability, chronic pain, loss of motion, weakness, stiffness, DVT, pulmonary embolus, death, complex regional pain syndrome, and need for additional procedures.  He understood all this had all questions answered prior to verbally consenting to proceed with surgery. No guarantees were given in regards to results from surgery.  We will proceed with surgery next available date  2. Patient denies any history of DVT or pulmonary embolus, he denies any cardiac history, he is not diabetic.      Luis Miguel Carrera was in agreement with plan and had all questions answered.     Orders:  Orders Placed This Encounter   Procedures   • Follow Anesthesia Guidelines / Standing Orders       Medications:  No orders of the defined types were placed in this encounter.      Followup:  No follow-ups on file.    Diagnoses and all orders for this visit:    1. Shoulder instability, right (Primary)  -     Case Request; Standing  -     acetaminophen (TYLENOL) tablet 975 mg  -     meloxicam (MOBIC) tablet 15 mg  -     pregabalin (LYRICA) capsule 150 mg  -     ceFAZolin (ANCEF) 3 g in sodium chloride 0.9 % 100 mL IVPB  -     Case Request    2. Tear of right glenoid labrum, initial encounter  -     Case Request; Standing  -     acetaminophen (TYLENOL) tablet 975 mg  -     meloxicam (MOBIC) tablet 15 mg  -     pregabalin (LYRICA) capsule 150 mg  -     ceFAZolin (ANCEF) 3 g  in sodium chloride 0.9 % 100 mL IVPB  -     Case Request    Other orders  -     Follow Anesthesia Guidelines / Standing Orders; Future  -     Follow Anesthesia Guidelines / Standing Orders; Standing  -     Verify NPO Status; Standing  -     SCD (sequential compression device)- to be placed on patient in Pre-op; Standing  -     Clip operative site; Standing  -     Obtain informed consent (if not collected inpatient or PAT); Standing  -     CBC (No Diff); Standing  -     Protime-INR; Standing  -     aPTT; Standing          Dictated utilizing Dragon dictation     Scribed for Mj Elaine MD by Casie Borden.  07/21/21   11:37 EDT    I have personally performed the services described in this document as scribed by the above individual, and it is both accurate and complete.  Mj Elaine MD  7/31/2021  15:29 EDT

## 2021-07-29 ENCOUNTER — TELEPHONE (OUTPATIENT)
Dept: ORTHOPEDIC SURGERY | Facility: CLINIC | Age: 36
End: 2021-07-29

## 2021-07-31 RX ORDER — MELOXICAM 7.5 MG/1
15 TABLET ORAL ONCE
Status: CANCELLED | OUTPATIENT
Start: 2021-07-31 | End: 2021-07-31

## 2021-07-31 RX ORDER — CEFAZOLIN SODIUM IN 0.9 % NACL 3 G/100 ML
3 INTRAVENOUS SOLUTION, PIGGYBACK (ML) INTRAVENOUS ONCE
Status: CANCELLED | OUTPATIENT
Start: 2021-08-20 | End: 2021-07-31

## 2021-07-31 RX ORDER — PREGABALIN 150 MG/1
150 CAPSULE ORAL ONCE
Status: CANCELLED | OUTPATIENT
Start: 2021-07-31 | End: 2021-07-31

## 2021-07-31 RX ORDER — ACETAMINOPHEN 325 MG/1
1000 TABLET ORAL ONCE
Status: CANCELLED | OUTPATIENT
Start: 2021-07-31 | End: 2021-07-31

## 2021-08-18 ENCOUNTER — PRE-ADMISSION TESTING (OUTPATIENT)
Dept: PREADMISSION TESTING | Facility: HOSPITAL | Age: 36
End: 2021-08-18

## 2021-08-18 VITALS
HEART RATE: 89 BPM | HEIGHT: 71 IN | RESPIRATION RATE: 16 BRPM | DIASTOLIC BLOOD PRESSURE: 87 MMHG | OXYGEN SATURATION: 99 % | BODY MASS INDEX: 37.23 KG/M2 | WEIGHT: 265.9 LBS | SYSTOLIC BLOOD PRESSURE: 151 MMHG

## 2021-08-18 LAB
ANION GAP SERPL CALCULATED.3IONS-SCNC: 8.2 MMOL/L (ref 5–15)
APTT PPP: 32.8 SECONDS (ref 24.3–38.1)
BASOPHILS # BLD AUTO: 0.06 10*3/MM3 (ref 0–0.2)
BASOPHILS NFR BLD AUTO: 0.7 % (ref 0–1.5)
BUN SERPL-MCNC: 13 MG/DL (ref 6–20)
BUN/CREAT SERPL: 13.3 (ref 7–25)
CALCIUM SPEC-SCNC: 9.6 MG/DL (ref 8.6–10.5)
CHLORIDE SERPL-SCNC: 99 MMOL/L (ref 98–107)
CO2 SERPL-SCNC: 29.8 MMOL/L (ref 22–29)
CREAT SERPL-MCNC: 0.98 MG/DL (ref 0.76–1.27)
DEPRECATED RDW RBC AUTO: 38.5 FL (ref 37–54)
EOSINOPHIL # BLD AUTO: 0.18 10*3/MM3 (ref 0–0.4)
EOSINOPHIL NFR BLD AUTO: 2.2 % (ref 0.3–6.2)
ERYTHROCYTE [DISTWIDTH] IN BLOOD BY AUTOMATED COUNT: 11.7 % (ref 12.3–15.4)
GFR SERPL CREATININE-BSD FRML MDRD: 106 ML/MIN/1.73
GFR SERPL CREATININE-BSD FRML MDRD: 88 ML/MIN/1.73
GLUCOSE SERPL-MCNC: 133 MG/DL (ref 65–99)
HBA1C MFR BLD: 5.17 % (ref 4.8–5.6)
HCT VFR BLD AUTO: 58.1 % (ref 37.5–51)
HGB BLD-MCNC: 19.8 G/DL (ref 13–17.7)
IMM GRANULOCYTES # BLD AUTO: 0.03 10*3/MM3 (ref 0–0.05)
IMM GRANULOCYTES NFR BLD AUTO: 0.4 % (ref 0–0.5)
INR PPP: 0.97 (ref 0.9–1.1)
LYMPHOCYTES # BLD AUTO: 1.98 10*3/MM3 (ref 0.7–3.1)
LYMPHOCYTES NFR BLD AUTO: 24.4 % (ref 19.6–45.3)
MCH RBC QN AUTO: 30.9 PG (ref 26.6–33)
MCHC RBC AUTO-ENTMCNC: 34.1 G/DL (ref 31.5–35.7)
MCV RBC AUTO: 90.6 FL (ref 79–97)
MONOCYTES # BLD AUTO: 0.65 10*3/MM3 (ref 0.1–0.9)
MONOCYTES NFR BLD AUTO: 8 % (ref 5–12)
NEUTROPHILS NFR BLD AUTO: 5.21 10*3/MM3 (ref 1.7–7)
NEUTROPHILS NFR BLD AUTO: 64.3 % (ref 42.7–76)
NRBC BLD AUTO-RTO: 0 /100 WBC (ref 0–0.2)
PLATELET # BLD AUTO: 271 10*3/MM3 (ref 140–450)
PMV BLD AUTO: 8.8 FL (ref 6–12)
POTASSIUM SERPL-SCNC: 3.7 MMOL/L (ref 3.5–5.2)
PROTHROMBIN TIME: 12.9 SECONDS (ref 12.1–15)
QT INTERVAL: 362 MS
RBC # BLD AUTO: 6.41 10*6/MM3 (ref 4.14–5.8)
SARS-COV-2 RNA PNL SPEC NAA+PROBE: NOT DETECTED
SODIUM SERPL-SCNC: 137 MMOL/L (ref 136–145)
WBC # BLD AUTO: 8.11 10*3/MM3 (ref 3.4–10.8)

## 2021-08-18 PROCEDURE — C9803 HOPD COVID-19 SPEC COLLECT: HCPCS

## 2021-08-18 PROCEDURE — 36415 COLL VENOUS BLD VENIPUNCTURE: CPT

## 2021-08-18 PROCEDURE — 93005 ELECTROCARDIOGRAM TRACING: CPT

## 2021-08-18 PROCEDURE — 93010 ELECTROCARDIOGRAM REPORT: CPT | Performed by: INTERNAL MEDICINE

## 2021-08-18 PROCEDURE — 87635 SARS-COV-2 COVID-19 AMP PRB: CPT | Performed by: ORTHOPAEDIC SURGERY

## 2021-08-18 PROCEDURE — 85025 COMPLETE CBC W/AUTO DIFF WBC: CPT | Performed by: ORTHOPAEDIC SURGERY

## 2021-08-18 PROCEDURE — 85730 THROMBOPLASTIN TIME PARTIAL: CPT | Performed by: ORTHOPAEDIC SURGERY

## 2021-08-18 PROCEDURE — 85610 PROTHROMBIN TIME: CPT | Performed by: ORTHOPAEDIC SURGERY

## 2021-08-18 PROCEDURE — 80048 BASIC METABOLIC PNL TOTAL CA: CPT | Performed by: ORTHOPAEDIC SURGERY

## 2021-08-18 PROCEDURE — 83036 HEMOGLOBIN GLYCOSYLATED A1C: CPT | Performed by: ORTHOPAEDIC SURGERY

## 2021-08-19 ENCOUNTER — ANESTHESIA EVENT (OUTPATIENT)
Dept: PERIOP | Facility: HOSPITAL | Age: 36
End: 2021-08-19

## 2021-08-20 ENCOUNTER — ANESTHESIA (OUTPATIENT)
Dept: PERIOP | Facility: HOSPITAL | Age: 36
End: 2021-08-20

## 2021-08-20 ENCOUNTER — HOSPITAL ENCOUNTER (OUTPATIENT)
Facility: HOSPITAL | Age: 36
Setting detail: HOSPITAL OUTPATIENT SURGERY
Discharge: HOME | End: 2021-08-20
Attending: ORTHOPAEDIC SURGERY | Admitting: ORTHOPAEDIC SURGERY

## 2021-08-20 VITALS
WEIGHT: 264.2 LBS | HEART RATE: 80 BPM | TEMPERATURE: 98.7 F | SYSTOLIC BLOOD PRESSURE: 123 MMHG | BODY MASS INDEX: 36.85 KG/M2 | OXYGEN SATURATION: 92 % | DIASTOLIC BLOOD PRESSURE: 53 MMHG | RESPIRATION RATE: 18 BRPM

## 2021-08-20 DIAGNOSIS — M25.311 SHOULDER INSTABILITY, RIGHT: ICD-10-CM

## 2021-08-20 DIAGNOSIS — S43.431A TEAR OF RIGHT GLENOID LABRUM, INITIAL ENCOUNTER: ICD-10-CM

## 2021-08-20 PROCEDURE — 25010000002 ONDANSETRON PER 1 MG: Performed by: NURSE ANESTHETIST, CERTIFIED REGISTERED

## 2021-08-20 PROCEDURE — 25010000002 NEOSTIGMINE 10 MG/10ML SOLUTION: Performed by: NURSE ANESTHETIST, CERTIFIED REGISTERED

## 2021-08-20 PROCEDURE — 29807 SHO ARTHRS SRG RPR SLAP LES: CPT | Performed by: ORTHOPAEDIC SURGERY

## 2021-08-20 PROCEDURE — 25010000002 DEXAMETHASONE PER 1 MG: Performed by: NURSE ANESTHETIST, CERTIFIED REGISTERED

## 2021-08-20 PROCEDURE — 25010000002 PROPOFOL 10 MG/ML EMULSION: Performed by: NURSE ANESTHETIST, CERTIFIED REGISTERED

## 2021-08-20 PROCEDURE — 29807 SHO ARTHRS SRG RPR SLAP LES: CPT | Performed by: SPECIALIST/TECHNOLOGIST, OTHER

## 2021-08-20 PROCEDURE — 76942 ECHO GUIDE FOR BIOPSY: CPT | Performed by: ORTHOPAEDIC SURGERY

## 2021-08-20 PROCEDURE — C1713 ANCHOR/SCREW BN/BN,TIS/BN: HCPCS | Performed by: ORTHOPAEDIC SURGERY

## 2021-08-20 PROCEDURE — 25010000002 EPINEPHRINE PER 0.1 MG: Performed by: ORTHOPAEDIC SURGERY

## 2021-08-20 PROCEDURE — 25010000002 SUCCINYLCHOLINE PER 20 MG: Performed by: NURSE ANESTHETIST, CERTIFIED REGISTERED

## 2021-08-20 PROCEDURE — 25010000002 MIDAZOLAM PER 1MG: Performed by: NURSE ANESTHETIST, CERTIFIED REGISTERED

## 2021-08-20 DEVICE — BIORAPTOR CURVED 2.3 PK SUTURE                                    ANCHOR, WITH ONE ULTRABRAID NO.2                                    SUTURE COBRAID-BLUE
Type: IMPLANTABLE DEVICE | Site: SHOULDER | Status: FUNCTIONAL
Brand: BIORAPTOR

## 2021-08-20 RX ORDER — ACETAMINOPHEN 500 MG
1000 TABLET ORAL ONCE
Status: COMPLETED | OUTPATIENT
Start: 2021-08-20 | End: 2021-08-20

## 2021-08-20 RX ORDER — GLYCOPYRROLATE 0.2 MG/ML
INJECTION INTRAMUSCULAR; INTRAVENOUS AS NEEDED
Status: DISCONTINUED | OUTPATIENT
Start: 2021-08-20 | End: 2021-08-20 | Stop reason: SURG

## 2021-08-20 RX ORDER — ASPIRIN 81 MG/1
81 TABLET ORAL DAILY
Qty: 14 TABLET | Refills: 0 | Status: SHIPPED | OUTPATIENT
Start: 2021-08-20 | End: 2021-10-14

## 2021-08-20 RX ORDER — PROPOFOL 10 MG/ML
VIAL (ML) INTRAVENOUS AS NEEDED
Status: DISCONTINUED | OUTPATIENT
Start: 2021-08-20 | End: 2021-08-20 | Stop reason: SURG

## 2021-08-20 RX ORDER — LIDOCAINE HYDROCHLORIDE 20 MG/ML
INJECTION, SOLUTION INFILTRATION; PERINEURAL AS NEEDED
Status: DISCONTINUED | OUTPATIENT
Start: 2021-08-20 | End: 2021-08-20 | Stop reason: SURG

## 2021-08-20 RX ORDER — ONDANSETRON 2 MG/ML
4 INJECTION INTRAMUSCULAR; INTRAVENOUS ONCE AS NEEDED
Status: COMPLETED | OUTPATIENT
Start: 2021-08-20 | End: 2021-08-20

## 2021-08-20 RX ORDER — DEXAMETHASONE SODIUM PHOSPHATE 4 MG/ML
8 INJECTION, SOLUTION INTRA-ARTICULAR; INTRALESIONAL; INTRAMUSCULAR; INTRAVENOUS; SOFT TISSUE ONCE AS NEEDED
Status: COMPLETED | OUTPATIENT
Start: 2021-08-20 | End: 2021-08-20

## 2021-08-20 RX ORDER — LIDOCAINE HYDROCHLORIDE 10 MG/ML
0.5 INJECTION, SOLUTION EPIDURAL; INFILTRATION; INTRACAUDAL; PERINEURAL ONCE AS NEEDED
Status: DISCONTINUED | OUTPATIENT
Start: 2021-08-20 | End: 2021-08-20 | Stop reason: HOSPADM

## 2021-08-20 RX ORDER — SODIUM CHLORIDE, SODIUM LACTATE, POTASSIUM CHLORIDE, CALCIUM CHLORIDE 600; 310; 30; 20 MG/100ML; MG/100ML; MG/100ML; MG/100ML
100 INJECTION, SOLUTION INTRAVENOUS CONTINUOUS
Status: DISCONTINUED | OUTPATIENT
Start: 2021-08-20 | End: 2021-08-20 | Stop reason: HOSPADM

## 2021-08-20 RX ORDER — ROCURONIUM BROMIDE 10 MG/ML
INJECTION, SOLUTION INTRAVENOUS AS NEEDED
Status: DISCONTINUED | OUTPATIENT
Start: 2021-08-20 | End: 2021-08-20 | Stop reason: SURG

## 2021-08-20 RX ORDER — MELOXICAM 7.5 MG/1
15 TABLET ORAL ONCE
Status: COMPLETED | OUTPATIENT
Start: 2021-08-20 | End: 2021-08-20

## 2021-08-20 RX ORDER — SODIUM CHLORIDE, SODIUM LACTATE, POTASSIUM CHLORIDE, CALCIUM CHLORIDE 600; 310; 30; 20 MG/100ML; MG/100ML; MG/100ML; MG/100ML
9 INJECTION, SOLUTION INTRAVENOUS CONTINUOUS PRN
Status: DISCONTINUED | OUTPATIENT
Start: 2021-08-20 | End: 2021-08-20 | Stop reason: HOSPADM

## 2021-08-20 RX ORDER — SENNA PLUS 8.6 MG/1
1 TABLET ORAL NIGHTLY
Qty: 20 TABLET | Refills: 0 | Status: SHIPPED | OUTPATIENT
Start: 2021-08-20 | End: 2021-10-14

## 2021-08-20 RX ORDER — MAGNESIUM HYDROXIDE 1200 MG/15ML
LIQUID ORAL AS NEEDED
Status: DISCONTINUED | OUTPATIENT
Start: 2021-08-20 | End: 2021-08-20 | Stop reason: HOSPADM

## 2021-08-20 RX ORDER — PREGABALIN 75 MG/1
150 CAPSULE ORAL ONCE
Status: COMPLETED | OUTPATIENT
Start: 2021-08-20 | End: 2021-08-20

## 2021-08-20 RX ORDER — SODIUM CHLORIDE 0.9 % (FLUSH) 0.9 %
10 SYRINGE (ML) INJECTION AS NEEDED
Status: DISCONTINUED | OUTPATIENT
Start: 2021-08-20 | End: 2021-08-20 | Stop reason: HOSPADM

## 2021-08-20 RX ORDER — SCOLOPAMINE TRANSDERMAL SYSTEM 1 MG/1
1 PATCH, EXTENDED RELEASE TRANSDERMAL CONTINUOUS
Status: DISCONTINUED | OUTPATIENT
Start: 2021-08-20 | End: 2021-08-20 | Stop reason: HOSPADM

## 2021-08-20 RX ORDER — DEXMEDETOMIDINE HYDROCHLORIDE 100 UG/ML
INJECTION, SOLUTION INTRAVENOUS AS NEEDED
Status: DISCONTINUED | OUTPATIENT
Start: 2021-08-20 | End: 2021-08-20 | Stop reason: SURG

## 2021-08-20 RX ORDER — SODIUM CHLORIDE 0.9 % (FLUSH) 0.9 %
10 SYRINGE (ML) INJECTION EVERY 12 HOURS SCHEDULED
Status: DISCONTINUED | OUTPATIENT
Start: 2021-08-20 | End: 2021-08-20 | Stop reason: HOSPADM

## 2021-08-20 RX ORDER — KETAMINE HYDROCHLORIDE 10 MG/ML
INJECTION INTRAMUSCULAR; INTRAVENOUS AS NEEDED
Status: DISCONTINUED | OUTPATIENT
Start: 2021-08-20 | End: 2021-08-20 | Stop reason: SURG

## 2021-08-20 RX ORDER — SODIUM CHLORIDE 9 MG/ML
INJECTION, SOLUTION INTRAVENOUS AS NEEDED
Status: DISCONTINUED | OUTPATIENT
Start: 2021-08-20 | End: 2021-08-20 | Stop reason: HOSPADM

## 2021-08-20 RX ORDER — NEOSTIGMINE METHYLSULFATE 1 MG/ML
INJECTION, SOLUTION INTRAVENOUS AS NEEDED
Status: DISCONTINUED | OUTPATIENT
Start: 2021-08-20 | End: 2021-08-20 | Stop reason: SURG

## 2021-08-20 RX ORDER — SODIUM CHLORIDE 9 MG/ML
40 INJECTION, SOLUTION INTRAVENOUS AS NEEDED
Status: DISCONTINUED | OUTPATIENT
Start: 2021-08-20 | End: 2021-08-20 | Stop reason: HOSPADM

## 2021-08-20 RX ORDER — BUPIVACAINE HYDROCHLORIDE 5 MG/ML
INJECTION, SOLUTION EPIDURAL; INTRACAUDAL
Status: COMPLETED | OUTPATIENT
Start: 2021-08-20 | End: 2021-08-20

## 2021-08-20 RX ORDER — FAMOTIDINE 10 MG/ML
20 INJECTION, SOLUTION INTRAVENOUS
Status: COMPLETED | OUTPATIENT
Start: 2021-08-20 | End: 2021-08-20

## 2021-08-20 RX ORDER — CEFAZOLIN SODIUM IN 0.9 % NACL 3 G/100 ML
3 INTRAVENOUS SOLUTION, PIGGYBACK (ML) INTRAVENOUS ONCE
Status: COMPLETED | OUTPATIENT
Start: 2021-08-20 | End: 2021-08-20

## 2021-08-20 RX ORDER — ONDANSETRON 4 MG/1
4 TABLET, FILM COATED ORAL EVERY 8 HOURS PRN
Qty: 30 TABLET | Refills: 0 | Status: SHIPPED | OUTPATIENT
Start: 2021-08-20 | End: 2021-09-16 | Stop reason: SDUPTHER

## 2021-08-20 RX ORDER — MIDAZOLAM HYDROCHLORIDE 2 MG/2ML
2 INJECTION, SOLUTION INTRAMUSCULAR; INTRAVENOUS
Status: DISCONTINUED | OUTPATIENT
Start: 2021-08-20 | End: 2021-08-20 | Stop reason: HOSPADM

## 2021-08-20 RX ORDER — SUCCINYLCHOLINE CHLORIDE 20 MG/ML
INJECTION INTRAMUSCULAR; INTRAVENOUS AS NEEDED
Status: DISCONTINUED | OUTPATIENT
Start: 2021-08-20 | End: 2021-08-20 | Stop reason: SURG

## 2021-08-20 RX ORDER — OXYCODONE HYDROCHLORIDE AND ACETAMINOPHEN 5; 325 MG/1; MG/1
1 TABLET ORAL EVERY 4 HOURS PRN
Qty: 42 TABLET | Refills: 0 | Status: SHIPPED | OUTPATIENT
Start: 2021-08-20 | End: 2021-08-24 | Stop reason: SDUPTHER

## 2021-08-20 RX ORDER — LIDOCAINE HYDROCHLORIDE AND EPINEPHRINE 10; 10 MG/ML; UG/ML
INJECTION, SOLUTION INFILTRATION; PERINEURAL AS NEEDED
Status: DISCONTINUED | OUTPATIENT
Start: 2021-08-20 | End: 2021-08-20 | Stop reason: HOSPADM

## 2021-08-20 RX ORDER — ONDANSETRON 2 MG/ML
4 INJECTION INTRAMUSCULAR; INTRAVENOUS ONCE AS NEEDED
Status: DISCONTINUED | OUTPATIENT
Start: 2021-08-20 | End: 2021-08-20 | Stop reason: HOSPADM

## 2021-08-20 RX ADMIN — DEXMEDETOMIDINE 20 MCG: 100 INJECTION, SOLUTION, CONCENTRATE INTRAVENOUS at 13:35

## 2021-08-20 RX ADMIN — NEOSTIGMINE METHYLSULFATE 4 MG: 1 INJECTION INTRAVENOUS at 11:44

## 2021-08-20 RX ADMIN — FAMOTIDINE 20 MG: 10 INJECTION INTRAVENOUS at 09:02

## 2021-08-20 RX ADMIN — SUCCINYLCHOLINE CHLORIDE 200 MG: 20 INJECTION, SOLUTION INTRAMUSCULAR; INTRAVENOUS at 09:59

## 2021-08-20 RX ADMIN — ACETAMINOPHEN 1000 MG: 500 TABLET, FILM COATED ORAL at 07:18

## 2021-08-20 RX ADMIN — SODIUM CHLORIDE, POTASSIUM CHLORIDE, SODIUM LACTATE AND CALCIUM CHLORIDE 9 ML/HR: 600; 310; 30; 20 INJECTION, SOLUTION INTRAVENOUS at 07:28

## 2021-08-20 RX ADMIN — DEXAMETHASONE SODIUM PHOSPHATE 8 MG: 4 INJECTION, SOLUTION INTRAMUSCULAR; INTRAVENOUS at 09:06

## 2021-08-20 RX ADMIN — DEXMEDETOMIDINE 10 MCG: 100 INJECTION, SOLUTION, CONCENTRATE INTRAVENOUS at 09:10

## 2021-08-20 RX ADMIN — PROPOFOL 200 MG: 10 INJECTION, EMULSION INTRAVENOUS at 09:59

## 2021-08-20 RX ADMIN — MELOXICAM 15 MG: 7.5 TABLET ORAL at 07:18

## 2021-08-20 RX ADMIN — ROCURONIUM BROMIDE 20 MG: 10 INJECTION INTRAVENOUS at 10:21

## 2021-08-20 RX ADMIN — LIDOCAINE HYDROCHLORIDE 200 MG: 20 INJECTION, SOLUTION INFILTRATION; PERINEURAL at 09:59

## 2021-08-20 RX ADMIN — BUPIVACAINE HYDROCHLORIDE 15 ML: 5 INJECTION, SOLUTION EPIDURAL; INTRACAUDAL; PERINEURAL at 09:10

## 2021-08-20 RX ADMIN — PREGABALIN 150 MG: 75 CAPSULE ORAL at 07:18

## 2021-08-20 RX ADMIN — ONDANSETRON 4 MG: 2 INJECTION INTRAMUSCULAR; INTRAVENOUS at 09:02

## 2021-08-20 RX ADMIN — KETAMINE HYDROCHLORIDE 20 MG: 10 INJECTION, SOLUTION INTRAMUSCULAR; INTRAVENOUS at 10:03

## 2021-08-20 RX ADMIN — CEFAZOLIN 3 G: 1 INJECTION, POWDER, FOR SOLUTION INTRAMUSCULAR; INTRAVENOUS; PARENTERAL at 10:02

## 2021-08-20 RX ADMIN — SCOPALAMINE 1 PATCH: 1 PATCH, EXTENDED RELEASE TRANSDERMAL at 09:03

## 2021-08-20 RX ADMIN — DEXMEDETOMIDINE 20 MCG: 100 INJECTION, SOLUTION, CONCENTRATE INTRAVENOUS at 10:29

## 2021-08-20 RX ADMIN — GLYCOPYRROLATE 0.6 MG: 0.2 INJECTION INTRAMUSCULAR; INTRAVENOUS at 11:44

## 2021-08-20 RX ADMIN — MIDAZOLAM HYDROCHLORIDE 2 MG: 1 INJECTION, SOLUTION INTRAMUSCULAR; INTRAVENOUS at 09:04

## 2021-08-20 NOTE — ANESTHESIA PREPROCEDURE EVALUATION
" Anesthesia Evaluation     Patient summary reviewed and Nursing notes reviewed   history of anesthetic complications: PONV  NPO Solid Status: > 8 hours  NPO Liquid Status: > 8 hours           Airway   Mallampati: II  TM distance: >3 FB  Neck ROM: full  No difficulty expected  Dental      Pulmonary - normal exam   (+) sleep apnea (non compliant ),   (-) shortness of breath  Cardiovascular - normal exam  Exercise tolerance: good (4-7 METS)    ECG reviewed    (+) hypertension well controlled 2 medications or greater,   (-) angina      Neuro/Psych  (+) weakness, numbness (bilateral hands \"carpal tunnel\"), psychiatric history Depression and Bipolar,     GI/Hepatic/Renal/Endo    (+)  GERD well controlled,      Musculoskeletal     Abdominal   (+) obese,    Substance History   (+) drug use (Marijuana use everyday)     OB/GYN negative ob/gyn ROS         Other                        Anesthesia Plan    ASA 3     general with block     intravenous induction     Anesthetic plan, all risks, benefits, and alternatives have been provided, discussed and informed consent has been obtained with: patient.  Use of blood products discussed with patient  Consented to blood products.     "

## 2021-08-20 NOTE — ANESTHESIA PROCEDURE NOTES
Peripheral Block      Patient reassessed immediately prior to procedure    Patient location during procedure: pre-op  Start time: 8/20/2021 9:07 AM  Stop time: 8/20/2021 9:10 AM  Reason for block: at surgeon's request and post-op pain management  Performed by  CRNA: Zayra Cruz CRNA  Preanesthetic Checklist  Completed: patient identified, IV checked, site marked, risks and benefits discussed, surgical consent, monitors and equipment checked, pre-op evaluation and timeout performed  Prep:  Pt Position: supine  Sterile barriers:cap, mask, gloves and washed/disinfected hands  Prep: ChloraPrep  Procedure  Sedation:yes    Guidance:ultrasound guided, nerve stimulator and landmark technique  ULTRASOUND INTERPRETATION.  Using ultrasound guidance a 21 G gauge needle was placed in close proximity to the brachial plexus nerve, at which point, under ultrasound guidance anesthetic was injected in the area of the nerve and spread of the anesthesia was seen on ultrasound in close proximity thereto.  There were no abnormalities seen on ultrasound; a digital image was taken; and the patient tolerated the procedure with no complications. Images:still images obtained, printed/placed on chart    Laterality:right  Block Type:interscalene  Injection Technique:single-shot  Needle Type:echogenic  Needle Gauge:21 G  Resistance on Injection: none    Medications Used: bupivacaine PF (MARCAINE) injection 0.5%, 15 mL  Med admintered at 8/20/2021 9:10 AM      Post Assessment  Injection Assessment: negative aspiration for heme, no paresthesia on injection and incremental injection  Patient Tolerance:comfortable throughout block  Complications:no  Additional Notes  Lidocaine 2% 1ml skin infiltration

## 2021-08-20 NOTE — ANESTHESIA PROCEDURE NOTES
Airway  Urgency: elective    Date/Time: 8/20/2021 10:20 AM  End Time:8/20/2021 10:20 AM  Difficult airway    General Information and Staff    Patient location during procedure: OR  CRNA: Zayra Cruz CRNA    Indications and Patient Condition  Indications for airway management: airway protection    Preoxygenated: yes  MILS maintained throughout  Mask difficulty assessment: 1 - vent by mask (MV WITH 2 HANDS AND 2 PEOPLE WITH OA)    Final Airway Details  Final airway type: endotracheal airway      Successful airway: ETT  Cuffed: yes   Successful intubation technique: direct laryngoscopy and video laryngoscopy  Facilitating devices/methods: Bougie  Endotracheal tube insertion site: oral  Blade: CMAC  Blade size: 3  ETT size (mm): 7.5  Cormack-Lehane Classification: grade I - full view of glottis  Placement verified by: chest auscultation and capnometry   Measured from: lips  ETT/EBT  to lips (cm): 23  Number of attempts at approach: 3 or more  Assessment: lips, teeth, and gum same as pre-op and atraumatic intubation    Additional Comments  ATTEMPTS X 3 #1 WITH BAEZ 2 GRADE 4 VIEW, MV WITH OA 2 HANDS, ATTEMPT #2 MAGRATH 3 GRADE 1 VIEW UNABLE TO PASS TUBE OR ESCHMANN DUE TO ANTERIOR ANGLE OF AIRWAY, MV WITH OA AND 2 HANDS, ATTEMPT #3 AFTER REPOSITIONING WITH BLANKETS FOR PROPER SNIFFING POSITION CMAC 3 USED WITH GRADE ONE VIEW AND ESCHMANN PASSED EASILY AND ETT PASSED WITH EASE.

## 2021-08-20 NOTE — ANESTHESIA POSTPROCEDURE EVALUATION
Patient: Luis Miguel Carrera    Procedure Summary     Date: 08/20/21 Room / Location:  [facility] OR [facility] / [facility]    Anesthesia Start: 0952 Anesthesia Stop: 1229    Procedure: SHOULDER ARTHROSCOPY, ANTERIOR LABRAL REPAIR, GLENOID MICROFRACTURE (Right Shoulder) Diagnosis:       Shoulder instability, right      Tear of right glenoid labrum, initial encounter      (Shoulder instability, right [M25.311])      (Tear of right glenoid labrum, initial encounter [S43.431A])    Surgeons: Mj Elaine MD Provider: Zayra Cruz CRNA    Anesthesia Type: general with block ASA Status: 3          Anesthesia Type: general with block    Vitals  Vitals Value Taken Time   /56 08/20/21 1407   Temp 98.7 °F (37.1 °C) 08/20/21 1222   Pulse 89 08/20/21 1407   Resp 20 08/20/21 1407   SpO2 94 % 08/20/21 1410   Vitals shown include unvalidated device data.        Post Anesthesia Care and Evaluation    Patient location during evaluation: PHASE II  Patient participation: complete - patient participated  Level of consciousness: awake  Pain management: adequate  Airway patency: patent  Anesthetic complications: No anesthetic complications  PONV Status: none  Cardiovascular status: acceptable  Respiratory status: acceptable  Hydration status: acceptable

## 2021-08-24 DIAGNOSIS — S43.431A TEAR OF RIGHT GLENOID LABRUM, INITIAL ENCOUNTER: ICD-10-CM

## 2021-08-24 DIAGNOSIS — M25.311 SHOULDER INSTABILITY, RIGHT: ICD-10-CM

## 2021-08-24 RX ORDER — OXYCODONE HYDROCHLORIDE AND ACETAMINOPHEN 5; 325 MG/1; MG/1
1 TABLET ORAL EVERY 4 HOURS PRN
Qty: 42 TABLET | Refills: 0 | Status: SHIPPED | OUTPATIENT
Start: 2021-08-24 | End: 2021-09-02 | Stop reason: SDUPTHER

## 2021-08-27 ENCOUNTER — OFFICE VISIT (OUTPATIENT)
Dept: ORTHOPEDIC SURGERY | Facility: CLINIC | Age: 36
End: 2021-08-27

## 2021-08-27 VITALS — BODY MASS INDEX: 37.04 KG/M2 | WEIGHT: 264.55 LBS | HEIGHT: 71 IN

## 2021-08-27 DIAGNOSIS — Z98.890 STATUS POST ARTHROSCOPY OF SHOULDER: Primary | ICD-10-CM

## 2021-08-27 PROCEDURE — 99024 POSTOP FOLLOW-UP VISIT: CPT | Performed by: NURSE PRACTITIONER

## 2021-08-27 NOTE — PROGRESS NOTES
CC: F/u s/p right shoulder arthroscopy with labral repair, arthroscopic microfracture of glenoid chondral defect, DOS 08/20/2021    Interval History: Patient returns to clinic stating pain well controlled, has been using sling as instructed, denies any numbness or tingling over right arm. No fevers, chills, or sweats, and no drainage from incisions noted.    Exam:   Right shoulder- incisions clean, dry, sutures in place   Positive sensation all distributions right hand and proximal lateral aspect arm   Cap refill < 3 seconds, radial pulse 2+   Positive deltoid firing   Flex/extend fingers/thumb/wrist with 4+/5 strength, positive thumbs up, okay sign, cross finger adduction and abduction against resistance     Impression: s/p right shoulder arthroscopy with labral repair, arthroscopic microfracture of glenoid chondral defect     Plan:  1. D/c sutures today and replace with steri-strips- may shower, no submerging wounds x 4 weeks  2. F/u in 3 wks with Dr Elaine.   3. Will start PT at 4 wk milind. Continue sling at all times for now until follow-up with Dr. Elaine. Work on finger and wrist ROM. May do gentle elbow ROM 2x/day while out of sling for showering or changing clothes.   4. Off work for the next one week. Discussed may return to work in sling without use of right upper extremity on 09/07/2021 but reiterated the significance of no activity with the right upper extremity.  All questions answered.

## 2021-09-02 DIAGNOSIS — S43.431A TEAR OF RIGHT GLENOID LABRUM, INITIAL ENCOUNTER: ICD-10-CM

## 2021-09-02 DIAGNOSIS — M25.311 SHOULDER INSTABILITY, RIGHT: ICD-10-CM

## 2021-09-02 RX ORDER — OXYCODONE HYDROCHLORIDE AND ACETAMINOPHEN 5; 325 MG/1; MG/1
TABLET ORAL
Qty: 36 TABLET | Refills: 0 | Status: SHIPPED | OUTPATIENT
Start: 2021-09-02 | End: 2021-09-11 | Stop reason: SDUPTHER

## 2021-09-10 ENCOUNTER — TELEPHONE (OUTPATIENT)
Dept: ORTHOPEDIC SURGERY | Facility: CLINIC | Age: 36
End: 2021-09-10

## 2021-09-11 DIAGNOSIS — M25.311 SHOULDER INSTABILITY, RIGHT: ICD-10-CM

## 2021-09-11 DIAGNOSIS — S43.431A TEAR OF RIGHT GLENOID LABRUM, INITIAL ENCOUNTER: ICD-10-CM

## 2021-09-11 RX ORDER — OXYCODONE HYDROCHLORIDE AND ACETAMINOPHEN 5; 325 MG/1; MG/1
TABLET ORAL
Qty: 20 TABLET | Refills: 0 | Status: SHIPPED | OUTPATIENT
Start: 2021-09-11 | End: 2021-09-16 | Stop reason: SDUPTHER

## 2021-09-16 ENCOUNTER — OFFICE VISIT (OUTPATIENT)
Dept: ORTHOPEDIC SURGERY | Facility: CLINIC | Age: 36
End: 2021-09-16

## 2021-09-16 VITALS — BODY MASS INDEX: 36.96 KG/M2 | HEIGHT: 71 IN | WEIGHT: 264 LBS

## 2021-09-16 DIAGNOSIS — M25.311 SHOULDER INSTABILITY, RIGHT: Primary | ICD-10-CM

## 2021-09-16 DIAGNOSIS — Z98.890 STATUS POST ARTHROSCOPY OF SHOULDER: ICD-10-CM

## 2021-09-16 DIAGNOSIS — S43.431A TEAR OF RIGHT GLENOID LABRUM, INITIAL ENCOUNTER: ICD-10-CM

## 2021-09-16 PROCEDURE — 99024 POSTOP FOLLOW-UP VISIT: CPT | Performed by: ORTHOPAEDIC SURGERY

## 2021-09-16 RX ORDER — OXYCODONE HYDROCHLORIDE AND ACETAMINOPHEN 5; 325 MG/1; MG/1
TABLET ORAL
Qty: 40 TABLET | Refills: 0 | Status: SHIPPED | OUTPATIENT
Start: 2021-09-16 | End: 2021-10-14 | Stop reason: SDUPTHER

## 2021-09-16 RX ORDER — MELOXICAM 15 MG/1
15 TABLET ORAL DAILY
Qty: 30 TABLET | Refills: 0 | Status: SHIPPED | OUTPATIENT
Start: 2021-09-16

## 2021-09-16 RX ORDER — ONDANSETRON 4 MG/1
4 TABLET, FILM COATED ORAL EVERY 8 HOURS PRN
Qty: 30 TABLET | Refills: 0 | Status: SHIPPED | OUTPATIENT
Start: 2021-09-16

## 2021-09-16 NOTE — PROGRESS NOTES
CC: F/u s/p right shoulder anterior labral repair and glenoid microfracture DOS 8/20/2021    Interval History: Patient returns to clinic stating pain is doing fairly well, has been using sling as instructed, denies any numbness or tingling over right arm. No fevers, chills, or sweats, and no drainage from incisions noted.     Exam:   Right shoulder- incisions well healed Tolerates passive , passive ER 20   Positive sensation all distributions right hand and proximal  lateral aspect arm, positive deltoid firing   Cap refill < 3 seconds, radial pulse 2+   Positive deltoid firing   Flex/extend fingers/thumb/wrist with 4+/5 strength, positive thumbs up, okay sign, cross finger adduction and abduction against resistance     REHAB: We will follow standard anterior Bankart repair protocol though it may accelerate his time in the sling to be for only 4 weeks given his underlying stiffness and chondral changes.  Will assess his motion at 4 weeks and make a decision about continuing sling at that point.  We will start physical therapy at week 4.  Impression: s/p right shoulder anterior labral repair and glenoid microfracture     Plan:  1. Will begin PT in 2 weeks, continue sling for 2 weeks and then may discontinue  2. F/u in 4 wks to re-assess motion  3. Avoid abduction and ER of shoulder, instructed in light pendulum exercises that may begin now  4.  Refill given on Percocet, prescription for meloxicam to try to help with pain, refill given on Zofran

## 2021-09-28 ENCOUNTER — HOSPITAL ENCOUNTER (OUTPATIENT)
Dept: PHYSICAL THERAPY | Facility: HOSPITAL | Age: 36
Setting detail: THERAPIES SERIES
Discharge: HOME | End: 2021-09-28

## 2021-09-28 DIAGNOSIS — Z98.890 STATUS POST ARTHROSCOPY OF SHOULDER: Primary | ICD-10-CM

## 2021-09-28 PROCEDURE — 97161 PT EVAL LOW COMPLEX 20 MIN: CPT

## 2021-09-28 NOTE — THERAPY EVALUATION
Outpatient Physical Therapy Ortho Initial Evaluation  DANISH Mcgregor     Patient Name: Luis Miguel Carrera  : 1985  MRN: [id number]  Today's Date: 2021      Visit Date: 2021    Patient Active Problem List   Diagnosis   • Anxiety   • Major depressive disorder   • Bipolar affective disorder, current episode mixed (CMS/HCC)   • OCD (obsessive compulsive disorder)   • Excessive anger   • Gastroesophageal reflux disease without esophagitis   • Allergic rhinitis   • Recurrent acute suppurative otitis media without spontaneous rupture of left tympanic membrane   • Otitis externa of left ear   • Shortness of breath   • Generalized abdominal pain   • Perirectal abscess   • Anal fistula   • Tear of right glenoid labrum   • Shoulder instability, right   • Status post  s/p right shoulder arthroscopy with labral repair, arthroscopic microfracture of glenoid chondral defect, DOS 2021        Past Medical History:   Diagnosis Date   • Abdominal bloating    • Anxiety    • Bipolar 1 disorder (CMS/HCC)    • Change or removal of wound packing    • Depression    • Ear infection     frequent ear infections   • GERD (gastroesophageal reflux disease)    • Hand fracture, right    • Hypertension    • Lower abdominal pain    • Nausea and vomiting    • Obsessive compulsive disorder    • PONV (postoperative nausea and vomiting)    • PTSD (post-traumatic stress disorder)    • Rectal fistula     sched surgery   • Right shoulder pain     SCHEDULED FOR ARTHROSCOPY   • Sleep apnea     COULDNT TOLERATE MACHINE        Past Surgical History:   Procedure Laterality Date   • INCISION AND DRAINAGE PERIRECTAL ABSCESS Left 2017    Procedure: INCISION AND DRAINAGE OF PERIRECTAL ABSCESS;  Surgeon: Cameron Hair MD;  Location: [facility];  Service:    • INCISION AND DRAINAGE PERIRECTAL ABSCESS     • ORIF FINGER FRACTURE Right 2017    Procedure: PERCUTANOUS FIXATION OF SMALL FINGER METACARPAL FRACTURE RT HAND;  Surgeon: Cameron COLBERT  MD [place];  Location:  [facility];  Service:    • RECTAL FISTULOTOMY N/A 9/13/2017    Procedure: Exam under anesthesia, RECTAL FISTULOTOMY;  Surgeon: Cameron Hair MD;  Location:  [facility];  Service:    • SHOULDER ARTHROSCOPY W/ LABRAL REPAIR Right 8/20/2021    Procedure: SHOULDER ARTHROSCOPY, ANTERIOR LABRAL REPAIR, GLENOID MICROFRACTURE;  Surgeon: Mj Elaine MD;  Location:  [facility];  Service: Orthopedics;  Laterality: Right;   • SIGMOIDOSCOPY N/A 7/17/2017    Procedure: SIGMOIDOSCOPY RIGID;  Surgeon: Cameron Hair MD;  Location:  [facility];  Service:    • TONSILLECTOMY     • TONSILLECTOMY  2009       Visit Dx:     ICD-10-CM ICD-9-CM   1. Status post  s/p right shoulder arthroscopy with labral repair, arthroscopic microfracture of glenoid chondral defect, DOS 08/20/2021  Z98.890 V45.89         Patient History     Row Name 09/28/21 0900             History    Chief Complaint  Difficulty with daily activities;Joint stiffness;Muscle tenderness;Muscle weakness;Pain  -AS      Type of Pain  Shoulder pain Right  -AS      Date Current Problem(s) Began  08/20/21  -AS      Brief Description of Current Complaint  Luis Miguel Carrera presents to outpatient PT s/p right labral tear repair and repair of right shoulder glenoid chondral defect on 8-20-21 by Dr. Elaine. He is right-hand dominant. He reports 2 months ago he fell from a short ladder, turned, and landed on concrete. He believes the shoulder dislocated. He was not placed in a sling. The patient confirms 2 years ago he did dislocate the right shoulder and performed self-reduction. His current pain is rated 8/10 in severity and described as aching, stabbing, sharp, shooting, and throbbing in nature. Patient has been in sling since surgery per MD and can remove next week.  -AS      Previous treatment for THIS PROBLEM  Surgery  -AS      Surgery Date:  08/20/21  -AS      Patient/Caregiver Goals  Relieve pain;Return to prior level of function;Improve  mobility;Improve strength  -AS      Patient's Rating of General Health  Good  -AS      Hand Dominance  right-handed  -AS      Occupation/sports/leisure activities  Maintenance  -AS      Patient seeing anyone else for problem(s)?  Dr. Elaine  -AS      How has patient tried to help current problem?  rest, sling, ice, pain meds  -AS      What clinical tests have you had for this problem?  MRI  -AS      Results of Clinical Tests  Labral tear  -AS      Surgery/Hospitalization  8-20-21  -AS         Pain     Pain Location  Shoulder  -AS      Pain at Present  3  -AS      Pain at Best  0  -AS      Pain at Worst  8  -AS      Pain Frequency  Intermittent  -AS      Pain Description  Aching  -AS      What Performance Factors Make the Current Problem(s) WORSE?  use of RUE  -AS      What Performance Factors Make the Current Problem(s) BETTER?  rest  -AS         Daily Activities    Primary Language  English  -AS      How does patient learn best?  Listening;Reading;Demonstration  -AS      Teaching needs identified  Home Exercise Program;Management of Condition  -AS      Patient is concerned about/has problems with  Difficulty with self care (i.e. bathing, dressing, toileting:;Flexibility;Performing home management (household chores, shopping, care of dependents);Performing job responsibilities/community activities (work, school,;Performing sports, recreation, and play activities;Reaching over head;Repetitive movements of the hand, arm, shoulder  -AS      Does patient have problems with the following?  Depression;Anxiety  -AS      Barriers to learning  None  -AS      Pt Participated in POC and Goals  Yes  -AS         Safety    Are you being hurt, hit, or frightened by anyone at home or in your life?  No  -AS      Are you being neglected by a caregiver  No  -AS        User Key  (r) = Recorded By, (t) = Taken By, (c) = Cosigned By    Initials Name Provider Type    AS Bogdan Nam, PT Physical Therapist          PT Ortho      Row Name 09/28/21 0900       Precautions and Contraindications    Precautions/Limitations  shoulder precautions Bankart protocol in pt's chart  -AS       Subjective Pain    Able to rate subjective pain?  yes  -AS    Pre-Treatment Pain Level  3  -AS    Post-Treatment Pain Level  3  -AS       Posture/Observations    Posture- WNL  Posture is WNL  -AS    Observations  Incision healing  -AS       Right Upper Ext    Rt Shoulder Abduction PROM  90  -AS    Rt Shoulder Flexion PROM  90  -AS    Rt Shoulder External Rotation PROM  33  -AS    Rt Shoulder Internal Rotation PROM  41  -AS       MMT Right Upper Ext    Rt Shoulder Flexion MMT, Gross Movement  -- Not tested at IE due to recent surgery  -AS    Rt Shoulder ABduction MMT, Gross Movement  -- Not tested at IE due to recent surgery  -AS    Rt Shoulder Internal Rotation MMT, Gross Movement  -- Not tested at IE due to recent surgery  -AS    Rt Shoulder External Rotation MMT, Gross Movement  -- Not tested at IE due to recent surgery  -AS       Sensation    Sensation WNL?  WNL  -AS    Light Touch  No apparent deficits  -AS      User Key  (r) = Recorded By, (t) = Taken By, (c) = Cosigned By    Initials Name Provider Type    AS Bogdan Nam, PT Physical Therapist                      Therapy Education  Given: HEP, Symptoms/condition management, Pain management  Program: New  How Provided: Verbal, Demonstration, Written  Provided to: Patient  Level of Understanding: Teach back education performed, Verbalized, Demonstrated     PT OP Goals     Row Name 09/28/21 0900          PT Short Term Goals    STG Date to Achieve  10/19/21  -AS     STG 1  Patient to demonstrate compliance with his initial HEP for flexibility, ROM, and strengthening.  -AS     STG 2  Patient to report right shoulder pain on VAS of 4-5/10 at worst with activity.  -AS     STG 3  Patient to demonstrate improved right shoulder PROM to within 10 degrees of contralateral shoulder.  -AS     STG 4  Patient to  demonstrate improved right UE strength to 4-/5 in all planes.  -AS        Long Term Goals    LTG Date to Achieve  11/09/21  -AS     LTG 1  Patient to demonstrate compliance with his advanced HEP for flexibility, ROM, and strengthening.  -AS     LTG 2  Patient to report right shoulder pain on VAS of 0-1/10 at worst with activity.  -AS     LTG 3  Patient to demonstrate improved right shoulder AROM to within 10 degrees of contralateral shoulder.  -AS     LTG 4  Patient to demonstrate improved right UE strength to 4/5 in all planes.  -AS     LTG 5  Patient to report improved function and decreased pain on Quick DASH by >10-15 points.  -AS     LTG 6  Patient to return to work without restrictions.  -AS        Time Calculation    PT Goal Re-Cert Due Date  10/26/21  -AS       User Key  (r) = Recorded By, (t) = Taken By, (c) = Cosigned By    Initials Name Provider Type    AS Bogdan Nam, PT Physical Therapist          PT Assessment/Plan     Row Name 09/28/21 0900          PT Assessment    Functional Limitations  Limitation in home management;Limitations in community activities;Performance in leisure activities;Performance in self-care ADL;Performance in sport activities;Performance in work activities  -AS     Impairments  Impaired flexibility;Joint mobility;Muscle strength;Pain;Range of motion  -AS     Assessment Comments  Patient presents to outpatient PT s/p right Bankart labral tear repair and repair of right shoulder glenoid chondral defect on 8-20-21 by Dr. Elaine. Patient has been in sling since surgery per MD and can remove next week. We are to follow standard anterior Bankart repair protocol. Patient has limited right shoulder ROM, limited right UE strength, and increased pain with activity. Patient has limited function at this time secondary to the above.   -AS     Please refer to paper survey for additional self-reported information  Yes  -AS     Rehab Potential  Good  -AS     Patient/caregiver  participated in establishment of treatment plan and goals  Yes  -AS     Patient would benefit from skilled therapy intervention  Yes  -AS        PT Plan    PT Frequency  2x/week  -AS     Predicted Duration of Therapy Intervention (PT)  6-8 weeks  -AS     Planned CPT's?  PT RE-EVAL: 16249;PT THER PROC EA 15 MIN: 46283;PT THER ACT EA 15 MIN: 87613;PT MANUAL THERAPY EA 15 MIN: 29451;PT NEUROMUSC RE-EDUCATION EA 15 MIN: 98375  -AS       User Key  (r) = Recorded By, (t) = Taken By, (c) = Cosigned By    Initials Name Provider Type    AS Bogdan Nam, PT Physical Therapist          Modalities     Row Name 09/28/21 0900             Moist Heat    MH Applied  Yes  -AS      Location  Right Shoulder  -AS      PT Moist Heat Minutes  10  -AS      MH Prior to Rx  Yes  -AS         Ice    Ice Applied  Yes  -AS      Location  Right Shoulder  -AS      PT Ice Rx Minutes  10  -AS      Ice S/P Rx  Yes  -AS        User Key  (r) = Recorded By, (t) = Taken By, (c) = Cosigned By    Initials Name Provider Type    AS Bogdan Nam, PT Physical Therapist        OP Exercises     Row Name 09/28/21 0900             Subjective Pain    Able to rate subjective pain?  yes  -AS      Pre-Treatment Pain Level  3  -AS      Post-Treatment Pain Level  3  -AS        User Key  (r) = Recorded By, (t) = Taken By, (c) = Cosigned By    Initials Name Provider Type    AS Bogdan Nam, PT Physical Therapist           Manual Rx (last 36 hours)      Manual Treatments     Row Name 09/28/21 0900             Manual Rx 1    Manual Rx 1 Location  Right Shoulder   -AS      Manual Rx 1 Type  PROM - IR, ER, Flex, ABD - Gentle  -AS      Manual Rx 1 Duration  15 min  -AS        User Key  (r) = Recorded By, (t) = Taken By, (c) = Cosigned By    Initials Name Provider Type    AS Bogdan Nam, PT Physical Therapist                      Outcome Measure Options: Quick DASH  Quick DASH  Open a tight or new jar.: Mild Difficulty  Do heavy household  chores (e.g., wash walls, wash floors): Mild Difficulty  Carry a shopping bag or briefcase: Mild Difficulty  Wash your back: Unable  Use a knife to cut food: Mild Difficulty  Recreational activities in which you take some force or impact through your arm, should or hand (e.g. golf, hammering, tennis, etc.): Unable  During the past week, to what extent has your arm, shoulder, or hand problem interfered with your normal social activites with family, friends, neighbors or groups?: Slightly  During the past week, were you limited in your work or other regular daily activities as a result of your arm, shoulder or hand problem?: Very limited  Arm, Shoulder, or hand pain: Moderate  Tingling (pins and needles) in your arm, shoulder, or hand: None  During the past week, how much difficulty have you had sleeping because of the pain in your arm, shoulder or hand?: So much Difficulty that I can't sleep  Number of Questions Answered: 11  Quick DASH Score: 50  Work Module (Optional)  Using your usual technique for your work?: Unable  Doing your usual work because of arm, shoulder or hand pain?: Unable  Doing your work as well as you would like?: Unable  Spending your usual amount of time doing your work?: Unable  Work Module Score: 100         Time Calculation:     Start Time: 0855  Stop Time: 0950  Time Calculation (min): 55 min  Untimed Charges  PT Moist Heat Minutes: 10  PT Ice Rx Minutes: 10  Total Minutes  Untimed Charges Total Minutes: 20   Total Minutes: 20     Therapy Charges for Today     Code Description Service Date Service Provider Modifiers Qty    57264472829 HC PT EVAL LOW COMPLEXITY 4 9/28/2021 Bogdan Nam, PT GP 1          PT G-Codes  Outcome Measure Options: Quick DASH  Quick DASH Score: 50         Bogdan Nam, PT  9/28/2021

## 2021-09-30 ENCOUNTER — APPOINTMENT (OUTPATIENT)
Dept: PHYSICAL THERAPY | Facility: HOSPITAL | Age: 36
End: 2021-09-30

## 2021-10-05 ENCOUNTER — HOSPITAL ENCOUNTER (OUTPATIENT)
Dept: PHYSICAL THERAPY | Facility: HOSPITAL | Age: 36
Setting detail: THERAPIES SERIES
Discharge: HOME | End: 2021-10-05

## 2021-10-05 DIAGNOSIS — Z98.890 STATUS POST ARTHROSCOPY OF SHOULDER: Primary | ICD-10-CM

## 2021-10-05 PROCEDURE — 97140 MANUAL THERAPY 1/> REGIONS: CPT

## 2021-10-05 PROCEDURE — 97110 THERAPEUTIC EXERCISES: CPT

## 2021-10-05 NOTE — THERAPY TREATMENT NOTE
Outpatient Physical Therapy Ortho Treatment Note   Amy Quiroga     Patient Name: Luis Miguel Carrera  : 1985  MRN: [id number]  Today's Date: 10/5/2021      Visit Date: 10/05/2021    Visit Dx:    ICD-10-CM ICD-9-CM   1. Status post  s/p right shoulder arthroscopy with labral repair, arthroscopic microfracture of glenoid chondral defect, DOS 2021  Z98.890 V45.89       Patient Active Problem List   Diagnosis   • Anxiety   • Major depressive disorder   • Bipolar affective disorder, current episode mixed ([facility])   • OCD (obsessive compulsive disorder)   • Excessive anger   • Gastroesophageal reflux disease without esophagitis   • Allergic rhinitis   • Recurrent acute suppurative otitis media without spontaneous rupture of left tympanic membrane   • Otitis externa of left ear   • Shortness of breath   • Generalized abdominal pain   • Perirectal abscess   • Anal fistula   • Tear of right glenoid labrum   • Shoulder instability, right   • Status post  s/p right shoulder arthroscopy with labral repair, arthroscopic microfracture of glenoid chondral defect, DOS 2021        Past Medical History:   Diagnosis Date   • Abdominal bloating    • Anxiety    • Bipolar 1 disorder (CMS/HCC)    • Change or removal of wound packing    • Depression    • Ear infection     frequent ear infections   • GERD (gastroesophageal reflux disease)    • Hand fracture, right    • Hypertension    • Lower abdominal pain    • Nausea and vomiting    • Obsessive compulsive disorder    • PONV (postoperative nausea and vomiting)    • PTSD (post-traumatic stress disorder)    • Rectal fistula     sched surgery   • Right shoulder pain     SCHEDULED FOR ARTHROSCOPY   • Sleep apnea     COULDNT TOLERATE MACHINE        Past Surgical History:   Procedure Laterality Date   • INCISION AND DRAINAGE PERIRECTAL ABSCESS Left 2017    Procedure: INCISION AND DRAINAGE OF PERIRECTAL ABSCESS;  Surgeon: Cameron Hair MD;  Location: [facility];  Service:     • INCISION AND DRAINAGE PERIRECTAL ABSCESS     • ORIF FINGER FRACTURE Right 2/17/2017    Procedure: PERCUTANOUS FIXATION OF SMALL FINGER METACARPAL FRACTURE RT HAND;  Surgeon: Cameron Mathew MD;  Location:  [facility];  Service:    • RECTAL FISTULOTOMY N/A 9/13/2017    Procedure: Exam under anesthesia, RECTAL FISTULOTOMY;  Surgeon: Cameron Hair MD;  Location:  [facility];  Service:    • SHOULDER ARTHROSCOPY W/ LABRAL REPAIR Right 8/20/2021    Procedure: SHOULDER ARTHROSCOPY, ANTERIOR LABRAL REPAIR, GLENOID MICROFRACTURE;  Surgeon: Mj Elaine MD;  Location:  [facility];  Service: Orthopedics;  Laterality: Right;   • SIGMOIDOSCOPY N/A 7/17/2017    Procedure: SIGMOIDOSCOPY RIGID;  Surgeon: Cameron Hair MD;  Location: [facility];  Service:    • TONSILLECTOMY     • TONSILLECTOMY  2009                       PT Assessment/Plan     Row Name 10/05/21 1030          PT Assessment    Assessment Comments  Pt with good passive motion; initiated AA with cane in flexion & abd  -KM        PT Plan    PT Plan Comments  Continue per POC. Add pulleys  -KM       User Key  (r) = Recorded By, (t) = Taken By, (c) = Cosigned By    Initials Name Provider Type    Sendy Ann PTA Physical Therapy Assistant          Modalities     Row Name 10/05/21 1030             Moist Heat    Location  Right Shoulder  -KM      PT Moist Heat Minutes  10  -KM      MH Prior to Rx  Yes  -KM         Functional Testing    Outcome Measure Options  Quick DASH  -KM        User Key  (r) = Recorded By, (t) = Taken By, (c) = Cosigned By    Initials Name Provider Type    Sendy Ann PTA Physical Therapy Assistant        OP Exercises     Row Name 10/05/21 1030             Subjective Comments    Subjective Comments  Pt states his shoulder is doing well.   -KM         Exercise 1    Exercise Name 1  Cane: Flexion & Abd  -KM      Reps 1  15x  -KM      Time 1  5 sec hold  -KM        User Key  (r) = Recorded By, (t) = Taken By, (c) = Cosigned By     Initials Name Provider Type    Sendy Ann PTA Physical Therapy Assistant                      Manual Rx (last 36 hours)      Manual Treatments     Row Name 10/05/21 1030             Manual Rx 1    Manual Rx 1 Location  Right Shoulder   -KM      Manual Rx 1 Type  PROM - IR, ER, Flex, ABD - Gentle  -KM      Manual Rx 1 Duration  15 min  -KM        User Key  (r) = Recorded By, (t) = Taken By, (c) = Cosigned By    Initials Name Provider Type    Sendy Ann PTA Physical Therapy Assistant                   Outcome Measure Options: Quick DASH         Time Calculation:   Start Time: 1030  Stop Time: 1110  Time Calculation (min): 40 min  Untimed Charges  PT Moist Heat Minutes: 10  Total Minutes  Untimed Charges Total Minutes: 10   Total Minutes: 10  Therapy Charges for Today     Code Description Service Date Service Provider Modifiers Qty    41157593197 HC PT MANUAL THERAPY EA 15 MIN 10/5/2021 Sendy Blas PTA GP 1    06656339836 HC PT THER PROC EA 15 MIN 10/5/2021 Sendy Blas PTA GP 1          PT G-Codes  Outcome Measure Options: Quick DASH         Sendy Blas PTA  10/5/2021

## 2021-10-07 ENCOUNTER — APPOINTMENT (OUTPATIENT)
Dept: PHYSICAL THERAPY | Facility: HOSPITAL | Age: 36
End: 2021-10-07

## 2021-10-07 ENCOUNTER — HOSPITAL ENCOUNTER (OUTPATIENT)
Dept: PHYSICAL THERAPY | Facility: HOSPITAL | Age: 36
Setting detail: THERAPIES SERIES
Discharge: HOME | End: 2021-10-07

## 2021-10-07 DIAGNOSIS — Z98.890 STATUS POST ARTHROSCOPY OF SHOULDER: Primary | ICD-10-CM

## 2021-10-07 PROCEDURE — 97110 THERAPEUTIC EXERCISES: CPT

## 2021-10-07 PROCEDURE — 97140 MANUAL THERAPY 1/> REGIONS: CPT

## 2021-10-07 NOTE — THERAPY TREATMENT NOTE
Outpatient Physical Therapy Ortho Treatment Note   Amy Quiroga     Patient Name: Luis Miguel Carrera  : 1985  MRN: [id number]  Today's Date: 10/7/2021      Visit Date: 10/07/2021    Visit Dx:    ICD-10-CM ICD-9-CM   1. Status post  s/p right shoulder arthroscopy with labral repair, arthroscopic microfracture of glenoid chondral defect, DOS 2021  Z98.890 V45.89       Patient Active Problem List   Diagnosis   • Anxiety   • Major depressive disorder   • Bipolar affective disorder, current episode mixed ([facility])   • OCD (obsessive compulsive disorder)   • Excessive anger   • Gastroesophageal reflux disease without esophagitis   • Allergic rhinitis   • Recurrent acute suppurative otitis media without spontaneous rupture of left tympanic membrane   • Otitis externa of left ear   • Shortness of breath   • Generalized abdominal pain   • Perirectal abscess   • Anal fistula   • Tear of right glenoid labrum   • Shoulder instability, right   • Status post  s/p right shoulder arthroscopy with labral repair, arthroscopic microfracture of glenoid chondral defect, DOS 2021        Past Medical History:   Diagnosis Date   • Abdominal bloating    • Anxiety    • Bipolar 1 disorder (CMS/HCC)    • Change or removal of wound packing    • Depression    • Ear infection     frequent ear infections   • GERD (gastroesophageal reflux disease)    • Hand fracture, right    • Hypertension    • Lower abdominal pain    • Nausea and vomiting    • Obsessive compulsive disorder    • PONV (postoperative nausea and vomiting)    • PTSD (post-traumatic stress disorder)    • Rectal fistula     sched surgery   • Right shoulder pain     SCHEDULED FOR ARTHROSCOPY   • Sleep apnea     COULDNT TOLERATE MACHINE        Past Surgical History:   Procedure Laterality Date   • INCISION AND DRAINAGE PERIRECTAL ABSCESS Left 2017    Procedure: INCISION AND DRAINAGE OF PERIRECTAL ABSCESS;  Surgeon: Cameron Hair MD;  Location: [facility];  Service:     • INCISION AND DRAINAGE PERIRECTAL ABSCESS     • ORIF FINGER FRACTURE Right 2/17/2017    Procedure: PERCUTANOUS FIXATION OF SMALL FINGER METACARPAL FRACTURE RT HAND;  Surgeon: Cameron Mathew MD;  Location:  [facility];  Service:    • RECTAL FISTULOTOMY N/A 9/13/2017    Procedure: Exam under anesthesia, RECTAL FISTULOTOMY;  Surgeon: Cameron Hair MD;  Location:  [facility];  Service:    • SHOULDER ARTHROSCOPY W/ LABRAL REPAIR Right 8/20/2021    Procedure: SHOULDER ARTHROSCOPY, ANTERIOR LABRAL REPAIR, GLENOID MICROFRACTURE;  Surgeon: Mj Elaine MD;  Location:  [facility];  Service: Orthopedics;  Laterality: Right;   • SIGMOIDOSCOPY N/A 7/17/2017    Procedure: SIGMOIDOSCOPY RIGID;  Surgeon: Cameron Hair MD;  Location: [facility];  Service:    • TONSILLECTOMY     • TONSILLECTOMY  2009                       PT Assessment/Plan     Row Name 10/07/21 1100          PT Assessment    Assessment Comments  Progressed patient with PROM exercises today. Patient with improved right shoulder ROM and improved tolerance to PROM today.  -AS        PT Plan    PT Plan Comments  Continue with current treatment plan.  -AS       User Key  (r) = Recorded By, (t) = Taken By, (c) = Cosigned By    Initials Name Provider Type    AS Bogdan Nam, PT Physical Therapist            OP Exercises     Row Name 10/07/21 1100             Subjective Comments    Subjective Comments  Patient states his right shoulder is doing well.  -AS         Exercise 1    Exercise Name 1  Cane: Flexion & Abd  -AS      Reps 1  15x  -AS      Time 1  5 sec hold  -AS         Exercise 2    Exercise Name 2  Pulley's - Flex & ABD  -AS      Time 2  3 min each  -AS        User Key  (r) = Recorded By, (t) = Taken By, (c) = Cosigned By    Initials Name Provider Type    AS Bogdan Nam, PT Physical Therapist                                          Time Calculation:   Start Time: 1030  Stop Time: 1058  Time Calculation (min): 28 min  Therapy Charges  for Today     Code Description Service Date Service Provider Modifiers Qty    40786479178  PT THER PROC EA 15 MIN 10/7/2021 Bogdan Nam, PT GP 1    [id number]  PT MANUAL THERAPY EA 15 MIN 10/7/2021 Bogdan Nam, PT GP 1                    Bogdan Nam, PT  10/7/2021

## 2021-10-13 ENCOUNTER — DOCUMENTATION (OUTPATIENT)
Dept: PHYSICAL THERAPY | Facility: HOSPITAL | Age: 36
End: 2021-10-13

## 2021-10-14 ENCOUNTER — OFFICE VISIT (OUTPATIENT)
Dept: ORTHOPEDIC SURGERY | Facility: CLINIC | Age: 36
End: 2021-10-14

## 2021-10-14 VITALS — BODY MASS INDEX: 36.96 KG/M2 | WEIGHT: 264 LBS | HEIGHT: 71 IN

## 2021-10-14 DIAGNOSIS — M25.311 SHOULDER INSTABILITY, RIGHT: ICD-10-CM

## 2021-10-14 DIAGNOSIS — Z98.890 STATUS POST ARTHROSCOPY OF SHOULDER: Primary | ICD-10-CM

## 2021-10-14 DIAGNOSIS — S43.431A TEAR OF RIGHT GLENOID LABRUM, INITIAL ENCOUNTER: ICD-10-CM

## 2021-10-14 PROCEDURE — 99024 POSTOP FOLLOW-UP VISIT: CPT | Performed by: ORTHOPAEDIC SURGERY

## 2021-10-14 RX ORDER — OXYCODONE HYDROCHLORIDE AND ACETAMINOPHEN 5; 325 MG/1; MG/1
TABLET ORAL
Qty: 40 TABLET | Refills: 0 | Status: SHIPPED | OUTPATIENT
Start: 2021-10-14

## 2021-10-14 RX ORDER — DIVALPROEX SODIUM 250 MG/1
750 TABLET, DELAYED RELEASE ORAL SEE ADMIN INSTRUCTIONS
COMMUNITY
Start: 2021-09-12

## 2021-10-14 RX ORDER — PREDNISONE 10 MG/1
TABLET ORAL
Qty: 39 TABLET | Refills: 0 | Status: SHIPPED | OUTPATIENT
Start: 2021-10-14

## 2021-10-14 NOTE — PROGRESS NOTES
CC: F/u s/p right shoulder anterior labral repair and glenoid microfracture DOS 8/20/2021    Interval History: Patient returns to clinic stating that he is doing well. He experiences soreness in the mornings and following physical therapy, which causes him to have trouble sleeping some nights. He is only taking pain medication when he wakes up to relieve the soreness.      Exam:   Right shoulder-      Incisions well healed     Active  degrees, passive  degrees, Strength- 4+/5   Active external rotation 25 degrees, passive external rotation 30 degrees, Strength- 4+/5   Belly Press Strength- 4+/5   Positive sensation all distributions right hand and proximal lateral aspect arm, positive deltoid firing   Cap refill < 3 seconds, radial pulse 2+   Positive deltoid firing   Flex/extend fingers/thumb/wrist with positive thumbs up, okay sign, cross finger adduction and abduction against resistance     Impression: s/p right shoulder anterior labral repair and glenoid microfracture     Plan:  1. Discussed treatment options at length with patient at today's visit.   2. I explained to the patient that I would like him to attempt to come out of the sling.   3. Continue physical therapy & home exercises to build strength and range of motion. Avoid abduction and external rotation of  shoulder.   4. I provided him with a refill on his pain medication as well as a prescription for oral prednisone.  5. Follow up in 4 weeks. If he is not seeing improvement in his symptoms, we will discuss a corticosteroid injection at that time.     Transcribed from ambient dictation for Mj Elaine MD by Sonido Jerry.  10/14/21   14:25 EDT    I have personally performed the services described in this document as transcribed by the above individual, and it is both accurate and complete.  Mj Elaine MD  10/27/2021  20:53 EDT

## 2021-11-04 ENCOUNTER — TRANSCRIBE ORDERS (OUTPATIENT)
Dept: ULTRASOUND IMAGING | Facility: HOSPITAL | Age: 36
End: 2021-11-04

## 2021-11-04 ENCOUNTER — TRANSCRIBE ORDERS (OUTPATIENT)
Dept: ADMINISTRATIVE | Facility: HOSPITAL | Age: 36
End: 2021-11-04

## 2021-11-04 DIAGNOSIS — M71.22 SYNOVIAL CYST OF LEFT POPLITEAL SPACE: Primary | ICD-10-CM

## 2021-11-04 DIAGNOSIS — M71.22 BAKER'S CYST, LEFT: Primary | ICD-10-CM

## 2021-11-11 ENCOUNTER — TRANSCRIBE ORDERS (OUTPATIENT)
Dept: ADMINISTRATIVE | Facility: HOSPITAL | Age: 36
End: 2021-11-11

## 2021-11-11 DIAGNOSIS — M71.22 BAKER CYST, LEFT: Primary | ICD-10-CM

## 2021-12-10 ENCOUNTER — APPOINTMENT (OUTPATIENT)
Dept: SLEEP MEDICINE | Facility: HOSPITAL | Age: 36
End: 2021-12-10

## (undated) DEVICE — GOWN,NON-REINFORCED,SIRUS,SET IN SLV,XXL: Brand: MEDLINE

## (undated) DEVICE — Device

## (undated) DEVICE — SKIN PREP TRAY W/CHG: Brand: MEDLINE INDUSTRIES, INC.

## (undated) DEVICE — PK ORTHO MINOR TOWER 40

## (undated) DEVICE — 3M™ STERI-DRAPE™ U-DRAPE 1015: Brand: STERI-DRAPE™

## (undated) DEVICE — JELLY,LUBE,STERILE,FLIP TOP,TUBE,4-OZ: Brand: MEDLINE

## (undated) DEVICE — CATH IV ANGIOCATH/AUTOGARD 18G 1.75IN GRN

## (undated) DEVICE — GLV SURG EUDERMIC PF LTX 8 STRL

## (undated) DEVICE — NDL HYPO SFTY GLD 22G 1 1/2IN

## (undated) DEVICE — APPL CHLORAPREP HI/LITE 26ML ORNG

## (undated) DEVICE — PREP SOL POVIDONE/IODINE BT 4OZ

## (undated) DEVICE — VIOLET BRAIDED (POLYGLACTIN 910), SYNTHETIC ABSORBABLE SUTURE: Brand: COATED VICRYL

## (undated) DEVICE — SUT SILK 2/0 TIES 18IN A185H

## (undated) DEVICE — ELECTRD NDL EZ CLN MOD 2.75IN

## (undated) DEVICE — DECANT BG O JET

## (undated) DEVICE — CATH URETRL AP 18F

## (undated) DEVICE — GLV SURG SENSICARE PI PF LF 7 GRN STRL

## (undated) DEVICE — SUCTION CANISTER, 1000CC,SAFELINER: Brand: DEROYAL

## (undated) DEVICE — TOWEL,OR,DSP,ST,BLUE,STD,4/PK,20PK/CS: Brand: MEDLINE

## (undated) DEVICE — GLV SURG BIOGEL LTX PF 7 1/2

## (undated) DEVICE — ACCU-PASS SUTURE SHUTTLE,                                    MONOFILAMENT SIZE NO.1, SINGLE PACK, STERILE: Brand: ACCU-PASS

## (undated) DEVICE — SUT ETHLN 3/0 PS2 18 IN 1669H

## (undated) DEVICE — TRY SKINPREP DRYPREP

## (undated) DEVICE — ENDOSCOPE KLEENSPEC ILLUM SYS 19 25CM

## (undated) DEVICE — SUCTION CANISTER, 3000CC,SAFELINER: Brand: DEROYAL

## (undated) DEVICE — DRSNG PAD ABD 8X10IN STRL

## (undated) DEVICE — TWIST DRILL FLEX CRVD FOR 2.3 SA

## (undated) DEVICE — PIN SFTY LG LF

## (undated) DEVICE — ACCU-PASS SUTURE SHUTTLE 45                                    DEGREE, RIGHT, STERILE: Brand: ACCU-PASS

## (undated) DEVICE — SOL ISO/ALC RUB 70PCT 4OZ

## (undated) DEVICE — DRAPE,REIN 53X77,STERILE: Brand: MEDLINE

## (undated) DEVICE — LAG MINOR PROCEDURE: Brand: MEDLINE INDUSTRIES, INC.

## (undated) DEVICE — SOL ANTISEP SCRB PVPI 7.5PCT 4OZ

## (undated) DEVICE — CANNULA THREADED FLEX 5.5 X 72MM: Brand: CLEAR-TRAC

## (undated) DEVICE — TRANSPOSAL ULTRAFLEX DUO/QUAD ULTRA CART MANIFOLD

## (undated) DEVICE — GLV SURG SENSICARE PI LF PF 7.0

## (undated) DEVICE — ABDOMINAL PAD: Brand: CURITY

## (undated) DEVICE — SYR LUERLOK 50ML

## (undated) DEVICE — CONN TBG Y 5 IN 1 LF STRL

## (undated) DEVICE — SUT PROLN 1 CTX 30IN 8455H

## (undated) DEVICE — DRP Z/FRICTION 10X16IN

## (undated) DEVICE — FLTR BULB INSUFL SIGMOIDOSCOPE

## (undated) DEVICE — NDL HYPO PRECISIONGLIDE/REG 18G 11/2 PNK

## (undated) DEVICE — SOL PVPI ANTISEPT SCRB 4OZ

## (undated) DEVICE — TB SXN  NONSTRL PRECUT1/4IN

## (undated) DEVICE — TP SILK DURAPORE 2 IN

## (undated) DEVICE — DISPOSABLE TOURNIQUET CUFF SINGLE BLADDER, SINGLE PORT AND QUICK CONNECT CONNECTOR: Brand: COLOR CUFF

## (undated) DEVICE — SYR LUERLOK 10CC

## (undated) DEVICE — FLTR BULB INSUFL

## (undated) DEVICE — PANTY MESH INCONT PROTECTION PLUS XL LF

## (undated) DEVICE — CLEAR-TRAC DISPOSABLE STOPCOCK: Brand: CLEAR-TRAC

## (undated) DEVICE — DRSNG SURESITE WNDW 4X4.5

## (undated) DEVICE — 3M™ STERI-STRIP™ REINFORCED ADHESIVE SKIN CLOSURES, R1546, 1/4 IN X 4 IN (6 MM X 100 MM), 10 STRIPS/ENVELOPE: Brand: 3M™ STERI-STRIP™

## (undated) DEVICE — SPNG GZ WOVN 4X4IN 12PLY 10/BX STRL

## (undated) DEVICE — SYR LUERLOK 20CC BX/50

## (undated) DEVICE — TUBING, SUCTION, 1/4" X 12', STRAIGHT: Brand: MEDLINE

## (undated) DEVICE — STRIP PACKING W IODOFORM 1/4

## (undated) DEVICE — GAUZE,SPONGE,FLUFF,6"X6.75",STRL,5/TRAY: Brand: MEDLINE

## (undated) DEVICE — DECANTER: Brand: UNBRANDED

## (undated) DEVICE — 1016 S-DRAPE IRRIG POUCH 10/BOX: Brand: STERI-DRAPE™

## (undated) DEVICE — PK SHLDR ARTHSCP 90

## (undated) DEVICE — COL CULT SYS

## (undated) DEVICE — 1010 S-DRAPE TOWEL DRAPE 10/BX: Brand: STERI-DRAPE™

## (undated) DEVICE — GLV SURG NEOLON 2G PF LF 7.5 STRL

## (undated) DEVICE — SLV DISTRACTION STAR VELCRO XLNG DISP

## (undated) DEVICE — ST TB GOFLO STRL

## (undated) DEVICE — SIGMOIDOSCOPIC SUCTION INSTRUMENT 18 FR W/WINGED CAP CONTROL AND 6 FOOT (1.8M) TUBING: Brand: SIGMOIDOSCOPIC

## (undated) DEVICE — SYR LL TP 10ML STRL

## (undated) DEVICE — PANTY KNIT MATERN L/XL

## (undated) DEVICE — CULT ANAERB

## (undated) DEVICE — 4.5 FULL RADIUS BONECUTTER BLADES,                                    YELLOW, 8000 MAXIMUM RPM, PACKAGED 6                                    PER BOX, STERILE

## (undated) DEVICE — DRSNG WND GZ CURAD OIL EMULSION 3X3IN STRL